# Patient Record
Sex: FEMALE | Race: WHITE
[De-identification: names, ages, dates, MRNs, and addresses within clinical notes are randomized per-mention and may not be internally consistent; named-entity substitution may affect disease eponyms.]

---

## 2017-09-17 ENCOUNTER — HOSPITAL ENCOUNTER (EMERGENCY)
Dept: HOSPITAL 47 - EC | Age: 82
LOS: 1 days | Discharge: HOME | End: 2017-09-18
Payer: MEDICARE

## 2017-09-17 VITALS — TEMPERATURE: 98.3 F | RESPIRATION RATE: 18 BRPM
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
## 2017-09-17 DIAGNOSIS — S60.512A: Primary | ICD-10-CM

## 2017-09-17 DIAGNOSIS — Z23: ICD-10-CM

## 2017-09-17 PROCEDURE — 90715 TDAP VACCINE 7 YRS/> IM: CPT

## 2017-09-17 PROCEDURE — 90471 IMMUNIZATION ADMIN: CPT

## 2017-09-17 PROCEDURE — 99282 EMERGENCY DEPT VISIT SF MDM: CPT

## 2017-09-17 NOTE — ED
Wound/Laceration HPI





- General


Chief Complaint: Wound/Laceration


Stated Complaint: hand lac


Time Seen by Provider: 09/17/17 23:19


Source: patient, RN notes reviewed


Mode of arrival: ambulatory


Limitations: altered mental status





- History of Present Illness


Initial Comments: 





83-year-old female presents emergency Department chief complaint of cat scratch 

her left hand.  She is unsure when her last tetanus was.  Patient states there'

s been some bruising from the site since the injury.  Patient states this was 

caused by her cat.  Patient states that she is diabetic though she is not 

taking any blood thinners and which would increase her bleeding recurs.  

Patient states she has full range of motion hand.





- Related Data


 Previous Rx's











 Medication  Instructions  Recorded


 


Amoxicillin/Potassium Clav 1 tab PO Q12HR #20 tab 09/17/17





[Augmentin 875-125 Tablet]  











 Allergies











Allergy/AdvReac Type Severity Reaction Status Date / Time


 


No Known Allergies Allergy   Verified 09/17/17 23:04














Review of Systems


ROS Statement: 


Those systems with pertinent positive or pertinent negative responses have been 

documented in the HPI.





ROS Other: All systems not noted in ROS Statement are negative.





Past Medical History


Past Medical History: Dementia, Diabetes Mellitus, Hypertension


History of Any Multi-Drug Resistant Organisms: None Reported


Past Surgical History: Cholecystectomy, Orthopedic Surgery


Past Psychological History: No Psychological Hx Reported


Smoking Status: Never smoker


Past Alcohol Use History: None Reported


Past Drug Use History: None Reported





General Exam


Limitations: altered mental status


General appearance: alert, in no apparent distress


Respiratory exam: Present: normal lung sounds bilaterally.  Absent: respiratory 

distress, wheezes, rales, rhonchi, stridor


Cardiovascular Exam: Present: regular rate, normal rhythm, normal heart sounds.

  Absent: systolic murmur, diastolic murmur, rubs, gallop, clicks


Extremities exam: Present: other (Left hand there is a 3 cm superficial 

laceration to the left hand between the first and second digit)


Skin exam: Present: warm, dry





Course





 Vital Signs











  09/17/17





  23:01


 


Temperature 98.3 F


 


Pulse Rate 65


 


Respiratory 18





Rate 


 


Blood Pressure 178/73


 


O2 Sat by Pulse 96





Oximetry 














Medical Decision Making





- Medical Decision Making





83-year-old female presented emergency for Ashtray this was thoroughly cleaned 

bacitracin applied there is no active bleeding.  The area was steri strips and 

dressed.  Patient we given antibiotics and return parameters were discussed.  

Patient's tetanus is updated.





Disposition


Clinical Impression: 


 Cat scratch





Disposition: HOME SELF-CARE


Condition: Stable


Instructions:  Animal Bite (ED)


Additional Instructions: 


Please return to the Emergency Department if symptoms worsen or any other 

concerns.


Prescriptions: 


Amoxicillin/Potassium Clav [Augmentin 875-125 Tablet] 1 tab PO Q12HR #20 tab


Referrals: 


Cipriano Burleson MD [Primary Care Provider] - 1-2 days


Time of Disposition: 23:36

## 2017-09-18 VITALS — DIASTOLIC BLOOD PRESSURE: 79 MMHG | HEART RATE: 72 BPM | SYSTOLIC BLOOD PRESSURE: 160 MMHG

## 2017-10-08 ENCOUNTER — HOSPITAL ENCOUNTER (EMERGENCY)
Dept: HOSPITAL 47 - EC | Age: 82
Discharge: HOME | End: 2017-10-08
Payer: MEDICARE

## 2017-10-08 VITALS — RESPIRATION RATE: 18 BRPM

## 2017-10-08 VITALS — HEART RATE: 78 BPM | TEMPERATURE: 97.6 F | SYSTOLIC BLOOD PRESSURE: 175 MMHG | DIASTOLIC BLOOD PRESSURE: 76 MMHG

## 2017-10-08 DIAGNOSIS — F03.90: ICD-10-CM

## 2017-10-08 DIAGNOSIS — N39.0: Primary | ICD-10-CM

## 2017-10-08 DIAGNOSIS — Z79.84: ICD-10-CM

## 2017-10-08 DIAGNOSIS — Z90.49: ICD-10-CM

## 2017-10-08 DIAGNOSIS — Z53.8: ICD-10-CM

## 2017-10-08 DIAGNOSIS — Z79.899: ICD-10-CM

## 2017-10-08 DIAGNOSIS — I10: ICD-10-CM

## 2017-10-08 DIAGNOSIS — E11.9: ICD-10-CM

## 2017-10-08 LAB
ALP SERPL-CCNC: 64 U/L (ref 38–126)
ALT SERPL-CCNC: 26 U/L (ref 9–52)
AMYLASE SERPL-CCNC: 41 U/L (ref 30–110)
ANION GAP SERPL CALC-SCNC: 11 MMOL/L
AST SERPL-CCNC: 36 U/L (ref 14–36)
BASOPHILS # BLD AUTO: 0 K/UL (ref 0–0.2)
BASOPHILS NFR BLD AUTO: 0 %
BUN SERPL-SCNC: 19 MG/DL (ref 7–17)
CALCIUM SPEC-MCNC: 9.6 MG/DL (ref 8.4–10.2)
CH: 29.3
CHCM: 32.1
CHLORIDE SERPL-SCNC: 105 MMOL/L (ref 98–107)
CO2 SERPL-SCNC: 25 MMOL/L (ref 22–30)
EOSINOPHIL # BLD AUTO: 0.3 K/UL (ref 0–0.7)
EOSINOPHIL NFR BLD AUTO: 3 %
ERYTHROCYTE [DISTWIDTH] IN BLOOD BY AUTOMATED COUNT: 4.43 M/UL (ref 3.8–5.4)
ERYTHROCYTE [DISTWIDTH] IN BLOOD: 14.4 % (ref 11.5–15.5)
GLUCOSE SERPL-MCNC: 145 MG/DL (ref 74–99)
GLUCOSE UR QL: (no result)
HCT VFR BLD AUTO: 40.7 % (ref 34–46)
HDW: 2.21
HGB BLD-MCNC: 13.4 GM/DL (ref 11.4–16)
LUC NFR BLD AUTO: 2 %
LYMPHOCYTES # SPEC AUTO: 1.9 K/UL (ref 1–4.8)
LYMPHOCYTES NFR SPEC AUTO: 23 %
MCH RBC QN AUTO: 30.1 PG (ref 25–35)
MCHC RBC AUTO-ENTMCNC: 32.8 G/DL (ref 31–37)
MCV RBC AUTO: 91.8 FL (ref 80–100)
MONOCYTES # BLD AUTO: 0.4 K/UL (ref 0–1)
MONOCYTES NFR BLD AUTO: 5 %
NEUTROPHILS # BLD AUTO: 5.6 K/UL (ref 1.3–7.7)
NEUTROPHILS NFR BLD AUTO: 67 %
NON-AFRICAN AMERICAN GFR(MDRD): >60
PARTICLE COUNT: 1587
PH UR: 7 [PH] (ref 5–8)
POTASSIUM SERPL-SCNC: 4.7 MMOL/L (ref 3.5–5.1)
PROT SERPL-MCNC: 7.3 G/DL (ref 6.3–8.2)
PROT UR QL: (no result)
RBC UR QL: 2 /HPF (ref 0–5)
SODIUM SERPL-SCNC: 141 MMOL/L (ref 137–145)
SP GR UR: 1.01 (ref 1–1.03)
SQUAMOUS UR QL AUTO: 3 /HPF (ref 0–4)
UA BILLING (MACRO VS. MICRO): (no result)
UROBILINOGEN UR QL STRIP: <2 MG/DL (ref ?–2)
WBC # BLD AUTO: 0.13 10*3/UL
WBC # BLD AUTO: 8.4 K/UL (ref 3.8–10.6)
WBC #/AREA URNS HPF: 18 /HPF (ref 0–5)
WBC (PEROX): 8.63

## 2017-10-08 PROCEDURE — 85025 COMPLETE CBC W/AUTO DIFF WBC: CPT

## 2017-10-08 PROCEDURE — 87086 URINE CULTURE/COLONY COUNT: CPT

## 2017-10-08 PROCEDURE — 82150 ASSAY OF AMYLASE: CPT

## 2017-10-08 PROCEDURE — 87077 CULTURE AEROBIC IDENTIFY: CPT

## 2017-10-08 PROCEDURE — 83690 ASSAY OF LIPASE: CPT

## 2017-10-08 PROCEDURE — 96360 HYDRATION IV INFUSION INIT: CPT

## 2017-10-08 PROCEDURE — 36415 COLL VENOUS BLD VENIPUNCTURE: CPT

## 2017-10-08 PROCEDURE — 74177 CT ABD & PELVIS W/CONTRAST: CPT

## 2017-10-08 PROCEDURE — 99284 EMERGENCY DEPT VISIT MOD MDM: CPT

## 2017-10-08 PROCEDURE — 87186 SC STD MICRODIL/AGAR DIL: CPT

## 2017-10-08 PROCEDURE — 81001 URINALYSIS AUTO W/SCOPE: CPT

## 2017-10-08 PROCEDURE — 96361 HYDRATE IV INFUSION ADD-ON: CPT

## 2017-10-08 PROCEDURE — 83605 ASSAY OF LACTIC ACID: CPT

## 2017-10-08 PROCEDURE — 80053 COMPREHEN METABOLIC PANEL: CPT

## 2017-10-08 NOTE — CT
EXAMINATION TYPE: CT abdomen pelvis w con

 

DATE OF EXAM: 10/8/2017

 

COMPARISON: NONE

 

HISTORY: Left sided pain

 

CT DLP: 602.8 mGycm

Automated exposure control for dose reduction was used.

 

TECHNIQUE:  Helical acquisition of images was performed from the lung bases through the pelvis.

 

CONTRAST: 

Performed without Oral Contrast and with IV Contrast, patient injected with 100 mL of Omnipaque 300.

 

FINDINGS: 

 

Heart appears enlarged. Lung bases are clear of consolidation. There is no pleural effusion.

 

Liver shows no focal defect. Spleen and pancreas appear normal. Gallbladder is not seen. Bile ducts a
re not dilated.

 

There is no adrenal mass. There are small bilateral renal cortical cysts. There is no hydronephrosis.
 There is no retroperitoneal adenopathy. There is no ascites. There are multiple sigmoid diverticula.
 Bladder distends smoothly. There is no sign of a pelvic mass. There are spondylotic changes in the l
umbar spine. Abdominal aorta is atheromatous.

IMPRESSION: 

MODERATELY SEVERE SIGMOID DIVERTICULOSIS. NO DEFINITE DIVERTICULITIS. ATHEROSCLEROTIC VASCULAR DISEAS
E. SPONDYLOTIC CHANGES IN THE LUMBAR SPINE. NO SIGN OF APPENDICITIS. MODERATE OSTEOARTHRITIS NOTED IN
 BOTH HIP JOINTS.

## 2017-10-08 NOTE — ED
General Adult HPI





- General


Chief complaint: Abdominal Pain


Stated complaint: Female 


Time Seen by Provider: 10/08/17 13:37


Source: patient, family, RN notes reviewed, old records reviewed


Mode of arrival: wheelchair


Limitations: no limitations





- History of Present Illness


Initial comments: 





This is an 84-year-old female to the ER for evaluation today.  Patient's 

presented for evaluation of abdominal pain.  States he has never had similar 

abdominal pain before, she is mainly concerned for urinary tract infection 

which is mid to left-sided abdominal pain left anterior abdominal pain.  Denies 

blood in her urine denies diarrhea denies nausea or vomiting.  No fevers no 

travel history.  Patient does have history of cholecystectomy.  Patient states 

she is eating and having bowel movement at this time.  Again pain started 

yesterday into today and is progressively worsening





- Related Data


 Home Medications











 Medication  Instructions  Recorded  Confirmed


 


Donepezil [Aricept] 10 mg PO DAILY 10/08/17 10/08/17


 


Losartan Potassium 75 mg PO DAILY 10/08/17 10/08/17


 


amLODIPine [Norvasc] 10 mg PO DAILY 10/08/17 10/08/17


 


metFORMIN HCL [Glucophage] 500 mg PO BID 10/08/17 10/08/17








 Previous Rx's











 Medication  Instructions  Recorded


 


Nitrofurantoin Monohyd/M-Cryst 100 mg PO Q12HR #10 cap 10/08/17





[Macrobid]  











 Allergies











Allergy/AdvReac Type Severity Reaction Status Date / Time


 


No Known Allergies Allergy   Verified 10/08/17 14:26














Review of Systems


ROS Statement: 


Those systems with pertinent positive or pertinent negative responses have been 

documented in the HPI.





ROS Other: All systems not noted in ROS Statement are negative.





Past Medical History


Past Medical History: Dementia, Diabetes Mellitus, Hypertension


History of Any Multi-Drug Resistant Organisms: None Reported


Past Surgical History: Cholecystectomy, Orthopedic Surgery


Past Psychological History: No Psychological Hx Reported


Smoking Status: Never smoker


Past Alcohol Use History: None Reported


Past Drug Use History: None Reported





General Exam


Limitations: no limitations


General appearance: alert, in no apparent distress


Head exam: Present: atraumatic, normocephalic, normal inspection


Eye exam: Present: normal appearance, PERRL, EOMI.  Absent: scleral icterus, 

conjunctival injection, periorbital swelling


ENT exam: Present: normal exam, mucous membranes moist


Neck exam: Present: normal inspection.  Absent: tenderness, meningismus, 

lymphadenopathy


Respiratory exam: Present: normal lung sounds bilaterally.  Absent: respiratory 

distress, wheezes, rales, rhonchi, stridor


Cardiovascular Exam: Present: regular rate, normal rhythm, normal heart sounds.

  Absent: systolic murmur, diastolic murmur, rubs, gallop, clicks


GI/Abdominal exam: Present: soft, tenderness (Left lower quadrant pain), normal 

bowel sounds.  Absent: distended, guarding, rebound, rigid


Extremities exam: Present: normal inspection, full ROM, normal capillary 

refill.  Absent: tenderness, pedal edema, joint swelling, calf tenderness


Back exam: Present: normal inspection


Neurological exam: Present: alert, oriented X3, CN II-XII intact


Psychiatric exam: Present: normal affect, normal mood


Skin exam: Present: warm, dry, intact, normal color.  Absent: rash





Course


 Vital Signs











  10/08/17 10/08/17





  13:15 14:29


 


Temperature 98.7 F 


 


Pulse Rate 79 73


 


Respiratory 18 18





Rate  


 


Blood Pressure 169/77 165/81


 


O2 Sat by Pulse 95 95





Oximetry  














- Reevaluation(s)


Reevaluation #1: 





10/08/17 15:52


Patient has adequate pain control at this time with no distress





Medical Decision Making





- Medical Decision Making





84 female to the ER for evaluation of bowel pain.  Mild dysuria.  Patient has 

positive urinary tract infection lab work and CT are negative.  Patient can be 

discharged on





- Lab Data


Result diagrams: 


 10/08/17 14:26





 10/08/17 14:26


 Lab Results











  10/08/17 10/08/17 10/08/17 Range/Units





  14:17 14:26 14:26 


 


WBC    8.4  (3.8-10.6)  k/uL


 


RBC    4.43  (3.80-5.40)  m/uL


 


Hgb    13.4  (11.4-16.0)  gm/dL


 


Hct    40.7  (34.0-46.0)  %


 


MCV    91.8  (80.0-100.0)  fL


 


MCH    30.1  (25.0-35.0)  pg


 


MCHC    32.8  (31.0-37.0)  g/dL


 


RDW    14.4  (11.5-15.5)  %


 


Plt Count    360  (150-450)  k/uL


 


Neutrophils %    67  %


 


Lymphocytes %    23  %


 


Monocytes %    5  %


 


Eosinophils %    3  %


 


Basophils %    0  %


 


Neutrophils #    5.6  (1.3-7.7)  k/uL


 


Lymphocytes #    1.9  (1.0-4.8)  k/uL


 


Monocytes #    0.4  (0-1.0)  k/uL


 


Eosinophils #    0.3  (0-0.7)  k/uL


 


Basophils #    0.0  (0-0.2)  k/uL


 


Sodium   141   (137-145)  mmol/L


 


Potassium   4.7   (3.5-5.1)  mmol/L


 


Chloride   105   ()  mmol/L


 


Carbon Dioxide   25   (22-30)  mmol/L


 


Anion Gap   11   mmol/L


 


BUN   19 H   (7-17)  mg/dL


 


Creatinine   0.82   (0.52-1.04)  mg/dL


 


Est GFR (MDRD) Af Amer   >60   (>60 ml/min/1.73 sqM)  


 


Est GFR (MDRD) Non-Af   >60   (>60 ml/min/1.73 sqM)  


 


Glucose   145 H   (74-99)  mg/dL


 


Plasma Lactic Acid Victor Manuel     (0.7-2.0)  mmol/L


 


Calcium   9.6   (8.4-10.2)  mg/dL


 


Total Bilirubin   0.7   (0.2-1.3)  mg/dL


 


AST   36   (14-36)  U/L


 


ALT   26   (9-52)  U/L


 


Alkaline Phosphatase   64   ()  U/L


 


Total Protein   7.3   (6.3-8.2)  g/dL


 


Albumin   4.2   (3.5-5.0)  g/dL


 


Amylase   41   ()  U/L


 


Lipase   95   ()  U/L


 


Urine Color  Yellow    


 


Urine Appearance  Clear    (Clear)  


 


Urine pH  7.0    (5.0-8.0)  


 


Ur Specific Gravity  1.014    (1.001-1.035)  


 


Urine Protein  2+ H    (Negative)  


 


Urine Glucose (UA)  1+ H    (Negative)  


 


Urine Ketones  Negative    (Negative)  


 


Urine Blood  Negative    (Negative)  


 


Urine Nitrite  Negative    (Negative)  


 


Urine Bilirubin  Negative    (Negative)  


 


Urine Urobilinogen  <2.0    (<2.0)  mg/dL


 


Ur Leukocyte Esterase  Moderate H    (Negative)  


 


Urine RBC  2    (0-5)  /hpf


 


Urine WBC  18 H    (0-5)  /hpf


 


Ur Squamous Epith Cells  3    (0-4)  /hpf


 


Urine Mucus  Rare H    (None)  /hpf














  10/08/17 Range/Units





  14:26 


 


WBC   (3.8-10.6)  k/uL


 


RBC   (3.80-5.40)  m/uL


 


Hgb   (11.4-16.0)  gm/dL


 


Hct   (34.0-46.0)  %


 


MCV   (80.0-100.0)  fL


 


MCH   (25.0-35.0)  pg


 


MCHC   (31.0-37.0)  g/dL


 


RDW   (11.5-15.5)  %


 


Plt Count   (150-450)  k/uL


 


Neutrophils %   %


 


Lymphocytes %   %


 


Monocytes %   %


 


Eosinophils %   %


 


Basophils %   %


 


Neutrophils #   (1.3-7.7)  k/uL


 


Lymphocytes #   (1.0-4.8)  k/uL


 


Monocytes #   (0-1.0)  k/uL


 


Eosinophils #   (0-0.7)  k/uL


 


Basophils #   (0-0.2)  k/uL


 


Sodium   (137-145)  mmol/L


 


Potassium   (3.5-5.1)  mmol/L


 


Chloride   ()  mmol/L


 


Carbon Dioxide   (22-30)  mmol/L


 


Anion Gap   mmol/L


 


BUN   (7-17)  mg/dL


 


Creatinine   (0.52-1.04)  mg/dL


 


Est GFR (MDRD) Af Amer   (>60 ml/min/1.73 sqM)  


 


Est GFR (MDRD) Non-Af   (>60 ml/min/1.73 sqM)  


 


Glucose   (74-99)  mg/dL


 


Plasma Lactic Acid Victor Manuel  1.5  (0.7-2.0)  mmol/L


 


Calcium   (8.4-10.2)  mg/dL


 


Total Bilirubin   (0.2-1.3)  mg/dL


 


AST   (14-36)  U/L


 


ALT   (9-52)  U/L


 


Alkaline Phosphatase   ()  U/L


 


Total Protein   (6.3-8.2)  g/dL


 


Albumin   (3.5-5.0)  g/dL


 


Amylase   ()  U/L


 


Lipase   ()  U/L


 


Urine Color   


 


Urine Appearance   (Clear)  


 


Urine pH   (5.0-8.0)  


 


Ur Specific Gravity   (1.001-1.035)  


 


Urine Protein   (Negative)  


 


Urine Glucose (UA)   (Negative)  


 


Urine Ketones   (Negative)  


 


Urine Blood   (Negative)  


 


Urine Nitrite   (Negative)  


 


Urine Bilirubin   (Negative)  


 


Urine Urobilinogen   (<2.0)  mg/dL


 


Ur Leukocyte Esterase   (Negative)  


 


Urine RBC   (0-5)  /hpf


 


Urine WBC   (0-5)  /hpf


 


Ur Squamous Epith Cells   (0-4)  /hpf


 


Urine Mucus   (None)  /hpf














- Radiology Data


Radiology results: report reviewed (CT abdomen and pelvis is negative), image 

reviewed





Disposition


Clinical Impression: 


 UTI (urinary tract infection), Abdominal pain





Disposition: HOME SELF-CARE


Condition: Good


Instructions:  Urinary Tract Infection in Women (ED)


Prescriptions: 


Nitrofurantoin Monohyd/M-Cryst [Macrobid] 100 mg PO Q12HR #10 cap


Referrals: 


Cipriano Burleson MD [Primary Care Provider] - 1-2 days

## 2018-03-21 ENCOUNTER — HOSPITAL ENCOUNTER (INPATIENT)
Dept: HOSPITAL 47 - EC | Age: 83
LOS: 5 days | Discharge: NURSING, REHAB OR LONG-TERM CARE | DRG: 195 | End: 2018-03-26
Attending: FAMILY MEDICINE | Admitting: FAMILY MEDICINE
Payer: MEDICARE

## 2018-03-21 DIAGNOSIS — N28.9: ICD-10-CM

## 2018-03-21 DIAGNOSIS — F03.90: ICD-10-CM

## 2018-03-21 DIAGNOSIS — H91.10: ICD-10-CM

## 2018-03-21 DIAGNOSIS — Z86.19: ICD-10-CM

## 2018-03-21 DIAGNOSIS — Z90.49: ICD-10-CM

## 2018-03-21 DIAGNOSIS — E11.9: ICD-10-CM

## 2018-03-21 DIAGNOSIS — R19.7: ICD-10-CM

## 2018-03-21 DIAGNOSIS — Z87.440: ICD-10-CM

## 2018-03-21 DIAGNOSIS — I10: ICD-10-CM

## 2018-03-21 DIAGNOSIS — Z79.899: ICD-10-CM

## 2018-03-21 DIAGNOSIS — Z79.84: ICD-10-CM

## 2018-03-21 DIAGNOSIS — J18.9: Primary | ICD-10-CM

## 2018-03-21 LAB
ALBUMIN SERPL-MCNC: 3.1 G/DL (ref 3.5–5)
ALP SERPL-CCNC: 55 U/L (ref 38–126)
ALT SERPL-CCNC: 21 U/L (ref 9–52)
ANION GAP SERPL CALC-SCNC: 10 MMOL/L
APTT BLD: 22.1 SEC (ref 22–30)
AST SERPL-CCNC: 20 U/L (ref 14–36)
BASOPHILS # BLD AUTO: 0 K/UL (ref 0–0.2)
BASOPHILS NFR BLD AUTO: 0 %
BUN SERPL-SCNC: 34 MG/DL (ref 7–17)
CALCIUM SPEC-MCNC: 8.7 MG/DL (ref 8.4–10.2)
CHLORIDE SERPL-SCNC: 103 MMOL/L (ref 98–107)
CK SERPL-CCNC: 57 U/L (ref 30–135)
CK SERPL-CCNC: 62 U/L (ref 30–135)
CK SERPL-CCNC: 77 U/L (ref 30–135)
CO2 SERPL-SCNC: 23 MMOL/L (ref 22–30)
EOSINOPHIL # BLD AUTO: 0.1 K/UL (ref 0–0.7)
EOSINOPHIL NFR BLD AUTO: 1 %
ERYTHROCYTE [DISTWIDTH] IN BLOOD BY AUTOMATED COUNT: 3.99 M/UL (ref 3.8–5.4)
ERYTHROCYTE [DISTWIDTH] IN BLOOD: 13.8 % (ref 11.5–15.5)
GLUCOSE BLD-MCNC: 217 MG/DL (ref 75–99)
GLUCOSE SERPL-MCNC: 214 MG/DL (ref 74–99)
HCT VFR BLD AUTO: 34.6 % (ref 34–46)
HGB BLD-MCNC: 11.8 GM/DL (ref 11.4–16)
INR PPP: 1 (ref ?–1.2)
LYMPHOCYTES # SPEC AUTO: 0.7 K/UL (ref 1–4.8)
LYMPHOCYTES NFR SPEC AUTO: 9 %
MCH RBC QN AUTO: 29.5 PG (ref 25–35)
MCHC RBC AUTO-ENTMCNC: 34 G/DL (ref 31–37)
MCV RBC AUTO: 86.7 FL (ref 80–100)
MONOCYTES # BLD AUTO: 0.5 K/UL (ref 0–1)
MONOCYTES NFR BLD AUTO: 7 %
NEUTROPHILS # BLD AUTO: 6.4 K/UL (ref 1.3–7.7)
NEUTROPHILS NFR BLD AUTO: 82 %
PLATELET # BLD AUTO: 305 K/UL (ref 150–450)
POTASSIUM SERPL-SCNC: 5.2 MMOL/L (ref 3.5–5.1)
PROT SERPL-MCNC: 5.7 G/DL (ref 6.3–8.2)
PT BLD: 9.6 SEC (ref 9–12)
SODIUM SERPL-SCNC: 136 MMOL/L (ref 137–145)
TROPONIN I SERPL-MCNC: <0.012 NG/ML (ref 0–0.03)
WBC # BLD AUTO: 7.8 K/UL (ref 3.8–10.6)

## 2018-03-21 PROCEDURE — 36415 COLL VENOUS BLD VENIPUNCTURE: CPT

## 2018-03-21 PROCEDURE — 83880 ASSAY OF NATRIURETIC PEPTIDE: CPT

## 2018-03-21 PROCEDURE — 82553 CREATINE MB FRACTION: CPT

## 2018-03-21 PROCEDURE — 94760 N-INVAS EAR/PLS OXIMETRY 1: CPT

## 2018-03-21 PROCEDURE — 85025 COMPLETE CBC W/AUTO DIFF WBC: CPT

## 2018-03-21 PROCEDURE — 85730 THROMBOPLASTIN TIME PARTIAL: CPT

## 2018-03-21 PROCEDURE — 93005 ELECTROCARDIOGRAM TRACING: CPT

## 2018-03-21 PROCEDURE — 82550 ASSAY OF CK (CPK): CPT

## 2018-03-21 PROCEDURE — 71046 X-RAY EXAM CHEST 2 VIEWS: CPT

## 2018-03-21 PROCEDURE — 85027 COMPLETE CBC AUTOMATED: CPT

## 2018-03-21 PROCEDURE — 80053 COMPREHEN METABOLIC PANEL: CPT

## 2018-03-21 PROCEDURE — 84484 ASSAY OF TROPONIN QUANT: CPT

## 2018-03-21 PROCEDURE — 85610 PROTHROMBIN TIME: CPT

## 2018-03-21 PROCEDURE — 99285 EMERGENCY DEPT VISIT HI MDM: CPT

## 2018-03-21 PROCEDURE — 96374 THER/PROPH/DIAG INJ IV PUSH: CPT

## 2018-03-21 RX ADMIN — ATENOLOL SCH MG: 50 TABLET ORAL at 20:08

## 2018-03-21 RX ADMIN — MEMANTINE HYDROCHLORIDE SCH MG: 5 TABLET ORAL at 20:08

## 2018-03-21 RX ADMIN — DONEPEZIL HYDROCHLORIDE SCH MG: 10 TABLET ORAL at 20:08

## 2018-03-21 RX ADMIN — Medication SCH MG: at 20:08

## 2018-03-21 NOTE — XR
EXAMINATION TYPE: XR chest 2V

 

DATE OF EXAM: 3/21/2018

 

COMPARISON: NONE

 

HISTORY: Cough and difficulty breathing.

 

TECHNIQUE:  Frontal and lateral views of the chest are obtained.

 

FINDINGS:  There is suspicious retrocardiac opacity on 2 views with air bronchograms. Right lung is c
lear. The cardiac silhouette size is upper limits of normal with atherosclerotic change in aortic kno
b. Spine is straightened on lateral image.

 

IMPRESSION:  Suspicious posterior left lower lobe infiltrate.

## 2018-03-21 NOTE — ED
SOB HPI





- General


Chief Complaint: Shortness of Breath


Stated Complaint: Cough, Congestion


Time Seen by Provider: 03/21/18 10:49


Source: patient, family


Mode of arrival: wheelchair


Limitations: altered mental status





- History of Present Illness


Initial Comments: 


84 years old lady came to the doctor's office she was quite short winded there 

she says sure shortness of breath been around for 2 days now and denies any 

chest pain it doesn't hurt to take deep breaths denies any fever or any chills 

she has been spitting up some phlegm.  She does have a history of diabetes and 

hypertension.  Denies any fever no chills no neck stiffness no abdominal pain 

no frequency urgency dysuria no symptoms of TIA or CVA








- Related Data


 Home Medications











 Medication  Instructions  Recorded  Confirmed


 


Donepezil [Aricept] 10 mg PO HS 10/08/17 03/21/18


 


Losartan Potassium 75 mg PO DAILY 10/08/17 03/21/18


 


amLODIPine [Norvasc] 10 mg PO DAILY 10/08/17 03/21/18


 


metFORMIN HCL [Glucophage] 500 mg PO AC-BID 10/08/17 03/21/18


 


Atenolol [Tenormin] 50 mg PO BID 03/21/18 03/21/18


 


Magnesium Oxide [Mag-Ox] 400 mg PO BID 03/21/18 03/21/18


 


Memantine [Namenda] 5 mg PO BID 03/21/18 03/21/18


 


Pantoprazole [Protonix] 40 mg PO DAILY 03/21/18 03/21/18


 


glipiZIDE [Glucotrol] 10 mg PO AC-BID 03/21/18 03/21/18











 Allergies











Allergy/AdvReac Type Severity Reaction Status Date / Time


 


No Known Allergies Allergy   Verified 03/21/18 10:38














Review of Systems


ROS Statement: 


Those systems with pertinent positive or pertinent negative responses have been 

documented in the HPI.





ROS Other: All systems not noted in ROS Statement are negative.





Past Medical History


Past Medical History: Dementia, Diabetes Mellitus, Hypertension


History of Any Multi-Drug Resistant Organisms: ESBL


Date of last positivie culture/infection: 10/8/17


MDRO Source:: URINE ESBL


Past Surgical History: Cholecystectomy, Orthopedic Surgery


Past Psychological History: No Psychological Hx Reported


Smoking Status: Never smoker


Past Alcohol Use History: None Reported


Past Drug Use History: None Reported





General Exam





- General Exam Comments


Initial Comments: 


General:  The patient is awake and alert, in mild distress 


Skin:  Skin is warm and dry and no rashes or lesions are noted. 


Eye:  Pupils are equal, round and reactive to light, extra-ocular movements are 

intact; there is normal conjunctiva bilaterally.  


Ears, nose, mouth and throat:  There are moist mucous membranes and no oral 

lesions. 


Neck:  The neck is supple, there is no tenderness    


Cardiovascular:  There is a regular rate and rhythm. No murmur, rub or gallop 

is appreciated.


Respiratory: To auscultation bilateral, noticed some crackles at the bases


Gastrointestinal:  Soft, non-distended, non-tender abdomen without masses or 

organomegaly noted. There is no rebound or guarding present. Bowel sounds are 

unremarkable. 


Back:  There is no tenderness to palpation in the midline. There is no obvious 

deformity.


Musculoskeletal:  Normal ROM, no tenderness, There is no pedal edema. There is 

no calf tenderness or swelling. No cords were appreciated.  


Neurological:  CN II-XII intact, Cranial nerves III through XII are intact. 

There are no obvious motor or sensory deficits. Coordination appears grossly 

intact. Speech is normal.


Psychiatric:  Cooperative, appropriate mood & affect, normal judgment.  








Limitations: altered mental status





Course


 Vital Signs











  03/21/18 03/21/18 03/21/18





  10:36 11:03 11:45


 


Temperature 98.0 F  


 


Pulse Rate 65 68 62


 


Respiratory 20 20 20





Rate   


 


Blood Pressure 148/68 150/65 127/60


 


O2 Sat by Pulse 94 L 97 97





Oximetry   














  03/21/18 03/21/18





  13:33 14:42


 


Temperature  


 


Pulse Rate 57 L 59 L


 


Respiratory 20 18





Rate  


 


Blood Pressure 121/60 116/59


 


O2 Sat by Pulse 97 97





Oximetry  








EKG is sinus bradycardia ventricular rate is 57 CT interval is 144 QRS duration 

is 72 QT/QTc is 470/444 review of this EKG does not reveal any ST elevation or 

ST depression





She is reassessed, CBC, INR, BS metabolic panel are unremarkable glucose is 214 

creatinine is 1.27 troponin is negative EKG was unremarkable considering she is 

84 and has pneumonia





Medical Decision Making





- Lab Data


Result diagrams: 


 03/21/18 11:00





 03/21/18 12:22


 Lab Results











  03/21/18 03/21/18 03/21/18 Range/Units





  11:00 11:00 11:00 


 


WBC   7.8   (3.8-10.6)  k/uL


 


RBC   3.99   (3.80-5.40)  m/uL


 


Hgb   11.8   (11.4-16.0)  gm/dL


 


Hct   34.6   (34.0-46.0)  %


 


MCV   86.7   (80.0-100.0)  fL


 


MCH   29.5   (25.0-35.0)  pg


 


MCHC   34.0   (31.0-37.0)  g/dL


 


RDW   13.8   (11.5-15.5)  %


 


Plt Count   305   (150-450)  k/uL


 


Neutrophils %   82   %


 


Lymphocytes %   9   %


 


Monocytes %   7   %


 


Eosinophils %   1   %


 


Basophils %   0   %


 


Neutrophils #   6.4   (1.3-7.7)  k/uL


 


Lymphocytes #   0.7 L   (1.0-4.8)  k/uL


 


Monocytes #   0.5   (0-1.0)  k/uL


 


Eosinophils #   0.1   (0-0.7)  k/uL


 


Basophils #   0.0   (0-0.2)  k/uL


 


PT     (9.0-12.0)  sec


 


INR     (<1.2)  


 


APTT     (22.0-30.0)  sec


 


Sodium     (137-145)  mmol/L


 


Potassium     (3.5-5.1)  mmol/L


 


Chloride     ()  mmol/L


 


Carbon Dioxide     (22-30)  mmol/L


 


Anion Gap     mmol/L


 


BUN     (7-17)  mg/dL


 


Creatinine     (0.52-1.04)  mg/dL


 


Est GFR (CKD-EPI)AfAm     (>60 ml/min/1.73 sqM)  


 


Est GFR (CKD-EPI)NonAf     (>60 ml/min/1.73 sqM)  


 


Glucose     (74-99)  mg/dL


 


Calcium     (8.4-10.2)  mg/dL


 


Total Bilirubin     (0.2-1.3)  mg/dL


 


AST     (14-36)  U/L


 


ALT     (9-52)  U/L


 


Alkaline Phosphatase     ()  U/L


 


Total Creatine Kinase  77    ()  U/L


 


CK-MB (CK-2)  1.0    (0.0-2.4)  ng/mL


 


CK-MB (CK-2) Rel Index  1.3    


 


Troponin I  <0.012    (0.000-0.034)  ng/mL


 


NT-Pro-B Natriuret Pep    1400  pg/mL


 


Total Protein     (6.3-8.2)  g/dL


 


Albumin     (3.5-5.0)  g/dL














  03/21/18 03/21/18 Range/Units





  11:00 12:22 


 


WBC    (3.8-10.6)  k/uL


 


RBC    (3.80-5.40)  m/uL


 


Hgb    (11.4-16.0)  gm/dL


 


Hct    (34.0-46.0)  %


 


MCV    (80.0-100.0)  fL


 


MCH    (25.0-35.0)  pg


 


MCHC    (31.0-37.0)  g/dL


 


RDW    (11.5-15.5)  %


 


Plt Count    (150-450)  k/uL


 


Neutrophils %    %


 


Lymphocytes %    %


 


Monocytes %    %


 


Eosinophils %    %


 


Basophils %    %


 


Neutrophils #    (1.3-7.7)  k/uL


 


Lymphocytes #    (1.0-4.8)  k/uL


 


Monocytes #    (0-1.0)  k/uL


 


Eosinophils #    (0-0.7)  k/uL


 


Basophils #    (0-0.2)  k/uL


 


PT  9.6   (9.0-12.0)  sec


 


INR  1.0   (<1.2)  


 


APTT  22.1   (22.0-30.0)  sec


 


Sodium   136 L  (137-145)  mmol/L


 


Potassium   5.2 H  (3.5-5.1)  mmol/L


 


Chloride   103  ()  mmol/L


 


Carbon Dioxide   23  (22-30)  mmol/L


 


Anion Gap   10  mmol/L


 


BUN   34 H  (7-17)  mg/dL


 


Creatinine   1.27 H  (0.52-1.04)  mg/dL


 


Est GFR (CKD-EPI)AfAm   45  (>60 ml/min/1.73 sqM)  


 


Est GFR (CKD-EPI)NonAf   39  (>60 ml/min/1.73 sqM)  


 


Glucose   214 H  (74-99)  mg/dL


 


Calcium   8.7  (8.4-10.2)  mg/dL


 


Total Bilirubin   0.4  (0.2-1.3)  mg/dL


 


AST   20  (14-36)  U/L


 


ALT   21  (9-52)  U/L


 


Alkaline Phosphatase   55  ()  U/L


 


Total Creatine Kinase    ()  U/L


 


CK-MB (CK-2)    (0.0-2.4)  ng/mL


 


CK-MB (CK-2) Rel Index    


 


Troponin I    (0.000-0.034)  ng/mL


 


NT-Pro-B Natriuret Pep    pg/mL


 


Total Protein   5.7 L  (6.3-8.2)  g/dL


 


Albumin   3.1 L  (3.5-5.0)  g/dL














Disposition


Clinical Impression: 


 Acute pulmonary edema, Pneumonia, Dyspnea, Renal insufficiency





Disposition: ADMITTED AS IP TO THIS HOSP


Condition: Good


Referrals: 


Cipriano Burleson MD [Primary Care Provider] - 1-2 days

## 2018-03-22 LAB
GLUCOSE BLD-MCNC: 175 MG/DL (ref 75–99)
GLUCOSE BLD-MCNC: 188 MG/DL (ref 75–99)
TROPONIN I SERPL-MCNC: <0.012 NG/ML (ref 0–0.03)

## 2018-03-22 RX ADMIN — DONEPEZIL HYDROCHLORIDE SCH MG: 10 TABLET ORAL at 21:02

## 2018-03-22 RX ADMIN — MEMANTINE HYDROCHLORIDE SCH MG: 5 TABLET ORAL at 09:47

## 2018-03-22 RX ADMIN — CEFTRIAXONE SODIUM SCH MG: 2 INJECTION, POWDER, FOR SOLUTION INTRAMUSCULAR; INTRAVENOUS at 10:41

## 2018-03-22 RX ADMIN — Medication SCH MG: at 09:47

## 2018-03-22 RX ADMIN — Medication SCH MG: at 21:02

## 2018-03-22 RX ADMIN — ATENOLOL SCH MG: 50 TABLET ORAL at 09:47

## 2018-03-22 RX ADMIN — MEMANTINE HYDROCHLORIDE SCH MG: 5 TABLET ORAL at 21:02

## 2018-03-22 RX ADMIN — ATENOLOL SCH MG: 50 TABLET ORAL at 21:02

## 2018-03-22 RX ADMIN — LOSARTAN POTASSIUM SCH MG: 25 TABLET, FILM COATED ORAL at 10:40

## 2018-03-22 RX ADMIN — PANTOPRAZOLE SODIUM SCH MG: 40 TABLET, DELAYED RELEASE ORAL at 10:40

## 2018-03-22 NOTE — P.HPIM
History of Present Illness


H&P Date: 03/22/18


Chief Complaint: Cough and shortness of breath.





This is a history and physical on a 4-year-old white female who recently saw my 

nurse practitioner yesterday and had significant cough and congestion.  

Evaluation in the emergency room because of her advancing age did show left 

lower lobe infiltrate.  She is now admitted for significant pneumonia given her 

high risk score.  She hasn't underlying history diabetes.  She is nonsmoker she 

has no second smoke exposure.  The patient is lucid and resting comfortably at 

this point.  No fever this morning.  However she does feel warm to the touch.  

The patient does not complain of any significant diarrhea or constipation.  The 

patient suspect she's had this for about 3-4 days.  Over-the-counter medication 

has not been palliative.





Review of Systems


Constitutional: Reports fever


Eyes: denies blurred vision, denies pain


Ears, nose, mouth and throat: Denies headache, Denies sore throat


Cardiovascular: Reports decreased exercise tolerance


Respiratory: Reports cough, Reports cough with sputum


Gastrointestinal: Denies abdominal pain, Denies diarrhea, Denies nausea, Denies 

vomiting


Genitourinary: Denies dysuria, Denies hematuria


Musculoskeletal: Denies myalgias





Past Medical History


Past Medical History: Dementia, Diabetes Mellitus, Hypertension


Additional Past Medical History / Comment(s): uti-ecoli


History of Any Multi-Drug Resistant Organisms: ESBL


Date of last positivie culture/infection: 10/8/17


MDRO Source:: URINE ESBL


Past Surgical History: Cholecystectomy


Past Anesthesia/Blood Transfusion Reactions: No Reported Reaction


Smoking Status: Never smoker





- Past Family History


  ** Father


History Unknown: Yes





  ** Mother


History Unknown: Yes





Medications and Allergies


 Home Medications











 Medication  Instructions  Recorded  Confirmed  Type


 


Donepezil [Aricept] 10 mg PO HS 10/08/17 03/21/18 History


 


Losartan Potassium 75 mg PO DAILY 10/08/17 03/21/18 History


 


amLODIPine [Norvasc] 10 mg PO DAILY 10/08/17 03/21/18 History


 


metFORMIN HCL [Glucophage] 500 mg PO AC-BID 10/08/17 03/21/18 History


 


Atenolol [Tenormin] 50 mg PO BID 03/21/18 03/21/18 History


 


Magnesium Oxide [Mag-Ox] 400 mg PO BID 03/21/18 03/21/18 History


 


Memantine [Namenda] 5 mg PO BID 03/21/18 03/21/18 History


 


Pantoprazole [Protonix] 40 mg PO DAILY 03/21/18 03/21/18 History


 


glipiZIDE [Glucotrol] 10 mg PO AC-BID 03/21/18 03/21/18 History











 Allergies











Allergy/AdvReac Type Severity Reaction Status Date / Time


 


No Known Allergies Allergy   Verified 03/21/18 10:38














Physical Exam


Vitals: 


 Vital Signs











  Temp Pulse Pulse Resp BP BP Pulse Ox


 


 03/21/18 23:00  100.3 F H   64  20   134/53  93 L


 


 03/21/18 18:03  100.7 F H   66  18   149/78  96


 


 03/21/18 17:19  99.1 F  64   20  149/61   95


 


 03/21/18 15:47   58 L   20  143/67   96


 


 03/21/18 14:42   59 L   18  116/59   97


 


 03/21/18 13:33   57 L   20  121/60   97


 


 03/21/18 11:45   62   20  127/60   97


 


 03/21/18 11:03   68   20  150/65   97


 


 03/21/18 10:36  98.0 F  65   20  148/68   94 L








 Intake and Output











 03/21/18 03/22/18 03/22/18





 22:59 06:59 14:59


 


Other:   


 


  Voiding Method Toilet  


 


  # Voids 1 1 


 


  Weight 65 kg  














- Constitutional


General appearance: no acute distress





- EENT


Eyes: EOMI





- Neck


Neck: no lymphadenopathy





- Respiratory


Respiratory: left: rhonchi, wheezing





- Cardiovascular


Rhythm: regular


Heart sounds: normal: S1, S2


Abnormal Heart Sounds: no S3 Gallop





- Gastrointestinal


General gastrointestinal: soft, no tenderness





- Integumentary


Integumentary: no cellulitis





- Neurologic


Neurologic: CNII-XII intact





- Musculoskeletal


Musculoskeletal: generalized weakness





- Psychiatric


Psychiatric: A&O x's 3, appropriate affect





Results


CBC & Chem 7: 


 03/21/18 11:00





 03/21/18 12:22


Labs: 


 Abnormal Lab Results - Last 24 Hours (Table)











  03/21/18 03/21/18 03/21/18 Range/Units





  11:00 12:22 20:49 


 


Lymphocytes #  0.7 L    (1.0-4.8)  k/uL


 


Sodium   136 L   (137-145)  mmol/L


 


Potassium   5.2 H   (3.5-5.1)  mmol/L


 


BUN   34 H   (7-17)  mg/dL


 


Creatinine   1.27 H   (0.52-1.04)  mg/dL


 


Glucose   214 H   (74-99)  mg/dL


 


POC Glucose (mg/dL)    217 H  (75-99)  mg/dL


 


Total Protein   5.7 L   (6.3-8.2)  g/dL


 


Albumin   3.1 L   (3.5-5.0)  g/dL














  03/22/18 Range/Units





  06:58 


 


Lymphocytes #   (1.0-4.8)  k/uL


 


Sodium   (137-145)  mmol/L


 


Potassium   (3.5-5.1)  mmol/L


 


BUN   (7-17)  mg/dL


 


Creatinine   (0.52-1.04)  mg/dL


 


Glucose   (74-99)  mg/dL


 


POC Glucose (mg/dL)  188 H  (75-99)  mg/dL


 


Total Protein   (6.3-8.2)  g/dL


 


Albumin   (3.5-5.0)  g/dL














Assessment and Plan


(1) Diabetes


Current Visit: Yes   Status: Chronic   Priority: Medium   Code(s): E11.9 - TYPE 

2 DIABETES MELLITUS WITHOUT COMPLICATIONS   SNOMED Code(s): 12989575


   





(2) Dyspnea


Current Visit: Yes   Status: Acute   Priority: High   Code(s): R06.00 - DYSPNEA

, UNSPECIFIED   SNOMED Code(s): 872497478


   





(3) Pneumonia


Current Visit: Yes   Status: Acute   Priority: High   Code(s): J18.9 - PNEUMONIA

, UNSPECIFIED ORGANISM   SNOMED Code(s): 747368570


   


Plan: 





Going continue current antibiotic treatment.





Add nebulizer treatments for respiratory status.





Increase activity as tolerated.





Check CBC and CMP in a.m. per





Reconcile medications and sliding scale.





See orders otherwise.


Time with Patient: Greater than 30

## 2018-03-23 LAB
ALBUMIN SERPL-MCNC: 3 G/DL (ref 3.5–5)
ALP SERPL-CCNC: 58 U/L (ref 38–126)
ALT SERPL-CCNC: 29 U/L (ref 9–52)
ANION GAP SERPL CALC-SCNC: 9 MMOL/L
AST SERPL-CCNC: 21 U/L (ref 14–36)
BUN SERPL-SCNC: 25 MG/DL (ref 7–17)
CALCIUM SPEC-MCNC: 8.8 MG/DL (ref 8.4–10.2)
CHLORIDE SERPL-SCNC: 105 MMOL/L (ref 98–107)
CO2 SERPL-SCNC: 26 MMOL/L (ref 22–30)
ERYTHROCYTE [DISTWIDTH] IN BLOOD BY AUTOMATED COUNT: 3.98 M/UL (ref 3.8–5.4)
ERYTHROCYTE [DISTWIDTH] IN BLOOD: 13.3 % (ref 11.5–15.5)
GLUCOSE SERPL-MCNC: 130 MG/DL (ref 74–99)
HCT VFR BLD AUTO: 34.2 % (ref 34–46)
HGB BLD-MCNC: 11.4 GM/DL (ref 11.4–16)
MCH RBC QN AUTO: 28.5 PG (ref 25–35)
MCHC RBC AUTO-ENTMCNC: 33.2 G/DL (ref 31–37)
MCV RBC AUTO: 85.9 FL (ref 80–100)
PLATELET # BLD AUTO: 292 K/UL (ref 150–450)
POTASSIUM SERPL-SCNC: 4.9 MMOL/L (ref 3.5–5.1)
PROT SERPL-MCNC: 5.6 G/DL (ref 6.3–8.2)
SODIUM SERPL-SCNC: 140 MMOL/L (ref 137–145)
WBC # BLD AUTO: 7 K/UL (ref 3.8–10.6)

## 2018-03-23 RX ADMIN — PANTOPRAZOLE SODIUM SCH MG: 40 TABLET, DELAYED RELEASE ORAL at 07:47

## 2018-03-23 RX ADMIN — ATENOLOL SCH MG: 50 TABLET ORAL at 21:25

## 2018-03-23 RX ADMIN — DONEPEZIL HYDROCHLORIDE SCH MG: 10 TABLET ORAL at 21:25

## 2018-03-23 RX ADMIN — MEMANTINE HYDROCHLORIDE SCH MG: 5 TABLET ORAL at 21:25

## 2018-03-23 RX ADMIN — Medication SCH MG: at 21:25

## 2018-03-23 RX ADMIN — Medication SCH MG: at 07:47

## 2018-03-23 RX ADMIN — MEMANTINE HYDROCHLORIDE SCH MG: 5 TABLET ORAL at 07:47

## 2018-03-23 RX ADMIN — LOSARTAN POTASSIUM SCH MG: 25 TABLET, FILM COATED ORAL at 07:47

## 2018-03-23 RX ADMIN — ATENOLOL SCH MG: 50 TABLET ORAL at 07:47

## 2018-03-23 RX ADMIN — CEFTRIAXONE SODIUM SCH MG: 2 INJECTION, POWDER, FOR SOLUTION INTRAMUSCULAR; INTRAVENOUS at 07:48

## 2018-03-23 NOTE — P.PN
Subjective


Progress Note Date: 03/23/18


Principal diagnosis: 





Continue pneumonia treatment.  Continuing care.





This is a continue present on an 84-year-old white female with known history of 

diabetes and element of presbycusis.  The patient has been admitted essentially 

for left lower lobe pneumonia.  The patient is actually improved quite well and 

sitting up in a chair.  She has been walking in walker.  Therapy has been 

consulted.  Clinically much more improved.  No significant nausea, vomiting or 

diarrhea is stated.





Objective





- Vital Signs


Vital signs: 


 Vital Signs











Temp  98.4 F   03/23/18 06:31


 


Pulse  61   03/23/18 06:31


 


Resp  18   03/23/18 07:48


 


BP  149/74   03/23/18 06:31


 


Pulse Ox  95   03/23/18 06:31








 Intake & Output











 03/22/18 03/23/18 03/23/18





 18:59 06:59 18:59


 


Intake Total 300 300 


 


Balance 300 300 


 


Weight 65 kg  65 kg


 


Intake:   


 


  Oral 300 300 


 


Other:   


 


  Voiding Method Incontinent  Toilet





   Incontinent


 


  # Voids 3 3 


 


  # Bowel Movements 1 2 














- Constitutional


General appearance: Present: average body habitus, cooperative





- EENT


Eyes: Absent: abnormal pupil





- Respiratory


Respiratory: left: diminished





- Cardiovascular


Rhythm: regular


Heart sounds: normal: S1, S2


Abnormal Heart Sounds: Absent: S3 Gallop





- Gastrointestinal


General gastrointestinal: Present: soft.  Absent: tenderness





- Psychiatric


Psychiatric: Present: A&O x's 3.  Absent: appropriate affect





- Labs


CBC & Chem 7: 


 03/23/18 07:05





 03/23/18 07:05


Labs: 


 Abnormal Lab Results - Last 24 Hours (Table)











  03/22/18 03/23/18 Range/Units





  12:03 07:05 


 


BUN   25 H  (7-17)  mg/dL


 


Creatinine   1.06 H  (0.52-1.04)  mg/dL


 


Glucose   130 H  (74-99)  mg/dL


 


POC Glucose (mg/dL)  175 H   (75-99)  mg/dL


 


Total Protein   5.6 L  (6.3-8.2)  g/dL


 


Albumin   3.0 L  (3.5-5.0)  g/dL














Assessment and Plan


(1) Diabetes


Current Visit: Yes   Status: Chronic   Priority: Medium   Code(s): E11.9 - TYPE 

2 DIABETES MELLITUS WITHOUT COMPLICATIONS   SNOMED Code(s): 79173092


   





(2) Dyspnea


Current Visit: Yes   Status: Acute   Priority: High   Code(s): R06.00 - DYSPNEA

, UNSPECIFIED   SNOMED Code(s): 769334665


   





(3) Pneumonia


Current Visit: Yes   Status: Acute   Priority: High   Code(s): J18.9 - PNEUMONIA

, UNSPECIFIED ORGANISM   SNOMED Code(s): 908444993


   


Plan: 





Continue current regimen or treatment. 





Check CBC and CMP in a.m..





Anticipate discharge in the a.m.





Dr. Rollins's group will be covering for the weekend.





Prognosis is improved.


Time with Patient: Less than 30

## 2018-03-24 LAB
ALBUMIN SERPL-MCNC: 3.4 G/DL (ref 3.5–5)
ALP SERPL-CCNC: 68 U/L (ref 38–126)
ALT SERPL-CCNC: 31 U/L (ref 9–52)
ANION GAP SERPL CALC-SCNC: 13 MMOL/L
AST SERPL-CCNC: 28 U/L (ref 14–36)
BUN SERPL-SCNC: 28 MG/DL (ref 7–17)
CALCIUM SPEC-MCNC: 9.2 MG/DL (ref 8.4–10.2)
CHLORIDE SERPL-SCNC: 104 MMOL/L (ref 98–107)
CO2 SERPL-SCNC: 23 MMOL/L (ref 22–30)
ERYTHROCYTE [DISTWIDTH] IN BLOOD BY AUTOMATED COUNT: 4.5 M/UL (ref 3.8–5.4)
ERYTHROCYTE [DISTWIDTH] IN BLOOD: 13.4 % (ref 11.5–15.5)
GLUCOSE SERPL-MCNC: 163 MG/DL (ref 74–99)
HCT VFR BLD AUTO: 40.1 % (ref 34–46)
HGB BLD-MCNC: 12.6 GM/DL (ref 11.4–16)
MCH RBC QN AUTO: 28.1 PG (ref 25–35)
MCHC RBC AUTO-ENTMCNC: 31.5 G/DL (ref 31–37)
MCV RBC AUTO: 89 FL (ref 80–100)
PLATELET # BLD AUTO: 277 K/UL (ref 150–450)
POTASSIUM SERPL-SCNC: 5 MMOL/L (ref 3.5–5.1)
PROT SERPL-MCNC: 6.4 G/DL (ref 6.3–8.2)
SODIUM SERPL-SCNC: 140 MMOL/L (ref 137–145)
WBC # BLD AUTO: 8.6 K/UL (ref 3.8–10.6)

## 2018-03-24 RX ADMIN — Medication SCH MG: at 08:47

## 2018-03-24 RX ADMIN — DONEPEZIL HYDROCHLORIDE SCH MG: 10 TABLET ORAL at 20:49

## 2018-03-24 RX ADMIN — CEFTRIAXONE SODIUM SCH MG: 2 INJECTION, POWDER, FOR SOLUTION INTRAMUSCULAR; INTRAVENOUS at 08:47

## 2018-03-24 RX ADMIN — MEMANTINE HYDROCHLORIDE SCH MG: 5 TABLET ORAL at 20:49

## 2018-03-24 RX ADMIN — PANTOPRAZOLE SODIUM SCH MG: 40 TABLET, DELAYED RELEASE ORAL at 08:47

## 2018-03-24 RX ADMIN — ATENOLOL SCH MG: 50 TABLET ORAL at 08:47

## 2018-03-24 RX ADMIN — Medication SCH MG: at 20:48

## 2018-03-24 RX ADMIN — ATENOLOL SCH MG: 50 TABLET ORAL at 20:49

## 2018-03-24 RX ADMIN — MEMANTINE HYDROCHLORIDE SCH MG: 5 TABLET ORAL at 08:47

## 2018-03-24 RX ADMIN — LOSARTAN POTASSIUM SCH MG: 25 TABLET, FILM COATED ORAL at 08:47

## 2018-03-24 NOTE — PN
PROGRESS NOTE



DATE OF SERVICE:

03/24/2018



Patient seen in the room by bedside, sitting up in a chair.  She is in no acute

distress.



VITAL SIGNS:  Temperature of 98.4, pulse 62, respiration 18, blood pressure 136/66, O2

saturation 97% on room air. HEENT: Atraumatic, normocephalic.  Pupils equal and

reactive to light.  Extraocular movements intact.  Buccal mucosa is fair. Neck is

supple.  No goiter or lymphadenopathy.  JVD is negative.  No carotid bruit heard.

LUNGS: Scattered rhonchi with rales at the left lower lobe.  The heart is regular rate

and rhythm without any murmurs or gallop rhythm. Abdomen is soft, nontender,

nondistended.  Bowel sounds positive. EXTREMITIES: No edema, clubbing or cyanosis.



LABS:

CBC: White blood count 8.6, hemoglobin 12.6, hematocrit 41.4, and platelet count 277.

Chemical profile: Sodium 140, potassium 5.0, chloride 104, bicarb 23, BUN 28,

creatinine 0.9, glucose 163.



ASSESSMENT:

1. Community-acquired pneumonia.

2. Dyspnea possibly secondary to pneumonia.

3. Type 2 diabetes mellitus, fairly controlled.

The patient remains on IV antibiotics and on nebulizer treatment.  Will increase

activity with PT, OT and also for directions towards discharge planning.  We will

continue to monitor electrolytes.  Continue with sliding scale protocol.  Possible

discharge in next 24 hours.





MMODL / IJN: [id number] / Job#: [id number]

## 2018-03-25 LAB
GLUCOSE BLD-MCNC: 114 MG/DL (ref 75–99)
GLUCOSE BLD-MCNC: 175 MG/DL (ref 75–99)
GLUCOSE BLD-MCNC: 236 MG/DL (ref 75–99)

## 2018-03-25 RX ADMIN — ATENOLOL SCH MG: 50 TABLET ORAL at 08:10

## 2018-03-25 RX ADMIN — Medication SCH MG: at 21:14

## 2018-03-25 RX ADMIN — MEMANTINE HYDROCHLORIDE SCH MG: 5 TABLET ORAL at 21:14

## 2018-03-25 RX ADMIN — DONEPEZIL HYDROCHLORIDE SCH MG: 10 TABLET ORAL at 21:14

## 2018-03-25 RX ADMIN — PANTOPRAZOLE SODIUM SCH MG: 40 TABLET, DELAYED RELEASE ORAL at 08:10

## 2018-03-25 RX ADMIN — MEMANTINE HYDROCHLORIDE SCH MG: 5 TABLET ORAL at 08:10

## 2018-03-25 RX ADMIN — ATENOLOL SCH MG: 50 TABLET ORAL at 21:14

## 2018-03-25 RX ADMIN — CEFTRIAXONE SODIUM SCH MG: 2 INJECTION, POWDER, FOR SOLUTION INTRAMUSCULAR; INTRAVENOUS at 08:11

## 2018-03-25 RX ADMIN — LOSARTAN POTASSIUM SCH MG: 25 TABLET, FILM COATED ORAL at 08:11

## 2018-03-25 RX ADMIN — Medication SCH MG: at 08:09

## 2018-03-26 VITALS — SYSTOLIC BLOOD PRESSURE: 137 MMHG | RESPIRATION RATE: 20 BRPM | HEART RATE: 66 BPM | DIASTOLIC BLOOD PRESSURE: 70 MMHG

## 2018-03-26 VITALS — TEMPERATURE: 97.8 F

## 2018-03-26 LAB — GLUCOSE BLD-MCNC: 141 MG/DL (ref 75–99)

## 2018-03-26 RX ADMIN — ATENOLOL SCH MG: 50 TABLET ORAL at 07:46

## 2018-03-26 RX ADMIN — MEMANTINE HYDROCHLORIDE SCH MG: 5 TABLET ORAL at 07:47

## 2018-03-26 RX ADMIN — LOSARTAN POTASSIUM SCH MG: 25 TABLET, FILM COATED ORAL at 07:47

## 2018-03-26 RX ADMIN — Medication SCH MG: at 07:46

## 2018-03-26 RX ADMIN — PANTOPRAZOLE SODIUM SCH MG: 40 TABLET, DELAYED RELEASE ORAL at 07:47

## 2018-03-26 RX ADMIN — CEFTRIAXONE SODIUM SCH MG: 2 INJECTION, POWDER, FOR SOLUTION INTRAMUSCULAR; INTRAVENOUS at 07:46

## 2018-03-26 NOTE — P.DS
Providers


Date of admission: 


03/21/18 15:23





Attending physician: 


Cipriano Burleson





Primary care physician: 


Cipriano Burleson








- Discharge Diagnosis(es)


(1) Diabetes


Current Visit: Yes   Status: Chronic   Priority: Medium   





(2) Dyspnea


Current Visit: Yes   Status: Acute   Priority: High   





(3) Pneumonia


Current Visit: Yes   Status: Acute   Priority: High   


Hospital Course: 





This is a discharge summary a 4-year-old white female essentially negative for 

left lower lobe pneumonia.  The patient was treated with community acquired 

protocol and did quite well.  No significant fever or chills on discharge are 

noted.  She had a little bit of diarrhea.  We will checks Clostridium difficile 

just to make sure she has not developed this.  If this is negative, we will go 

ahead and discharge today and follow-up in about 2 weeks.


Patient Condition at Discharge: Good





Plan - Discharge Summary


Discharge Rx Participant: No


New Discharge Prescriptions: 


New


   Cefuroxime Axetil [Ceftin] 500 mg PO BID #14 tab





Continue


   metFORMIN HCL [Glucophage] 500 mg PO AC-BID


   amLODIPine [Norvasc] 10 mg PO DAILY


   Donepezil [Aricept] 10 mg PO HS


   Losartan Potassium 75 mg PO DAILY


   Magnesium Oxide [Mag-Ox] 400 mg PO BID


   glipiZIDE [Glucotrol] 10 mg PO AC-BID


   Pantoprazole [Protonix] 40 mg PO DAILY


   Memantine [Namenda] 5 mg PO BID


   Atenolol [Tenormin] 50 mg PO BID


Discharge Medication List





Donepezil [Aricept] 10 mg PO HS 10/08/17 [History]


Losartan Potassium 75 mg PO DAILY 10/08/17 [History]


amLODIPine [Norvasc] 10 mg PO DAILY 10/08/17 [History]


metFORMIN HCL [Glucophage] 500 mg PO AC-BID 10/08/17 [History]


Atenolol [Tenormin] 50 mg PO BID 03/21/18 [History]


Magnesium Oxide [Mag-Ox] 400 mg PO BID 03/21/18 [History]


Memantine [Namenda] 5 mg PO BID 03/21/18 [History]


Pantoprazole [Protonix] 40 mg PO DAILY 03/21/18 [History]


glipiZIDE [Glucotrol] 10 mg PO AC-BID 03/21/18 [History]


Cefuroxime Axetil [Ceftin] 500 mg PO BID #14 tab 03/25/18 [Rx]








Follow up Appointment(s)/Referral(s): 


Cipriano Burleson MD [Primary Care Provider] - 1 Week


Patient Instructions/Handouts:  Pneumonia (DC)


Activity/Diet/Wound Care/Special Instructions: 


Cardiac, diabetic diet.





Activity as tolerated, fall precautions. 


Discharge Disposition: HOME SELF-CARE

## 2018-07-04 ENCOUNTER — HOSPITAL ENCOUNTER (EMERGENCY)
Dept: HOSPITAL 47 - EC | Age: 83
Discharge: HOME | End: 2018-07-04
Payer: MEDICARE

## 2018-07-04 VITALS
TEMPERATURE: 98 F | HEART RATE: 60 BPM | RESPIRATION RATE: 18 BRPM | DIASTOLIC BLOOD PRESSURE: 74 MMHG | SYSTOLIC BLOOD PRESSURE: 162 MMHG

## 2018-07-04 DIAGNOSIS — Z90.49: ICD-10-CM

## 2018-07-04 DIAGNOSIS — Z79.84: ICD-10-CM

## 2018-07-04 DIAGNOSIS — E11.9: ICD-10-CM

## 2018-07-04 DIAGNOSIS — I10: ICD-10-CM

## 2018-07-04 DIAGNOSIS — F03.90: ICD-10-CM

## 2018-07-04 DIAGNOSIS — K57.92: Primary | ICD-10-CM

## 2018-07-04 DIAGNOSIS — Z79.899: ICD-10-CM

## 2018-07-04 LAB
ALBUMIN SERPL-MCNC: 4 G/DL (ref 3.5–5)
ALP SERPL-CCNC: 71 U/L (ref 38–126)
ALT SERPL-CCNC: 25 U/L (ref 9–52)
AMYLASE SERPL-CCNC: 48 U/L (ref 30–110)
ANION GAP SERPL CALC-SCNC: 11 MMOL/L
AST SERPL-CCNC: 27 U/L (ref 14–36)
BASOPHILS # BLD AUTO: 0 K/UL (ref 0–0.2)
BASOPHILS NFR BLD AUTO: 0 %
BUN SERPL-SCNC: 20 MG/DL (ref 7–17)
CALCIUM SPEC-MCNC: 9.5 MG/DL (ref 8.4–10.2)
CHLORIDE SERPL-SCNC: 101 MMOL/L (ref 98–107)
CO2 SERPL-SCNC: 27 MMOL/L (ref 22–30)
EOSINOPHIL # BLD AUTO: 0.4 K/UL (ref 0–0.7)
EOSINOPHIL NFR BLD AUTO: 6 %
ERYTHROCYTE [DISTWIDTH] IN BLOOD BY AUTOMATED COUNT: 4.61 M/UL (ref 3.8–5.4)
ERYTHROCYTE [DISTWIDTH] IN BLOOD: 14.9 % (ref 11.5–15.5)
GLUCOSE SERPL-MCNC: 163 MG/DL (ref 74–99)
GLUCOSE UR QL: (no result)
HCT VFR BLD AUTO: 39.5 % (ref 34–46)
HGB BLD-MCNC: 12.9 GM/DL (ref 11.4–16)
LIPASE SERPL-CCNC: 93 U/L (ref 23–300)
LYMPHOCYTES # SPEC AUTO: 1.7 K/UL (ref 1–4.8)
LYMPHOCYTES NFR SPEC AUTO: 23 %
MCH RBC QN AUTO: 27.9 PG (ref 25–35)
MCHC RBC AUTO-ENTMCNC: 32.6 G/DL (ref 31–37)
MCV RBC AUTO: 85.7 FL (ref 80–100)
MONOCYTES # BLD AUTO: 0.5 K/UL (ref 0–1)
MONOCYTES NFR BLD AUTO: 7 %
NEUTROPHILS # BLD AUTO: 4.6 K/UL (ref 1.3–7.7)
NEUTROPHILS NFR BLD AUTO: 62 %
PH UR: 7 [PH] (ref 5–8)
PLATELET # BLD AUTO: 284 K/UL (ref 150–450)
POTASSIUM SERPL-SCNC: 4.5 MMOL/L (ref 3.5–5.1)
PROT SERPL-MCNC: 6.8 G/DL (ref 6.3–8.2)
PROT UR QL: (no result)
RBC UR QL: 1 /HPF (ref 0–5)
SODIUM SERPL-SCNC: 139 MMOL/L (ref 137–145)
SP GR UR: 1.01 (ref 1–1.03)
SQUAMOUS UR QL AUTO: <1 /HPF (ref 0–4)
UROBILINOGEN UR QL STRIP: <2 MG/DL (ref ?–2)
WBC # BLD AUTO: 7.4 K/UL (ref 3.8–10.6)
WBC #/AREA URNS HPF: 7 /HPF (ref 0–5)

## 2018-07-04 PROCEDURE — 85025 COMPLETE CBC W/AUTO DIFF WBC: CPT

## 2018-07-04 PROCEDURE — 36415 COLL VENOUS BLD VENIPUNCTURE: CPT

## 2018-07-04 PROCEDURE — 96360 HYDRATION IV INFUSION INIT: CPT

## 2018-07-04 PROCEDURE — 87086 URINE CULTURE/COLONY COUNT: CPT

## 2018-07-04 PROCEDURE — 99284 EMERGENCY DEPT VISIT MOD MDM: CPT

## 2018-07-04 PROCEDURE — 82150 ASSAY OF AMYLASE: CPT

## 2018-07-04 PROCEDURE — 81001 URINALYSIS AUTO W/SCOPE: CPT

## 2018-07-04 PROCEDURE — 74176 CT ABD & PELVIS W/O CONTRAST: CPT

## 2018-07-04 PROCEDURE — 80053 COMPREHEN METABOLIC PANEL: CPT

## 2018-07-04 PROCEDURE — 83690 ASSAY OF LIPASE: CPT

## 2018-07-04 NOTE — CT
EXAMINATION TYPE: CT abdomen pelvis wo con

 

DATE OF EXAM: 7/4/2018

 

COMPARISON: Prior CT study dated 10/8/2017.

 

HISTORY: LLQ pain

 

CT DLP: 472.3 mGycm

Automated exposure control for dose reduction was used.

 

TECHNIQUE:  Helical acquisition of images was performed from the lung bases through the pelvis.

 

FINDINGS: 

 

LUNG BASES: No significant abnormality is appreciated.

 

LIVER/GB: No significant abnormality is appreciated. Gallbladder is still not visualized.

 

PANCREAS: No significant abnormality is seen.

 

SPLEEN: No significant abnormality is seen.

 

ADRENALS: No significant abnormality is seen.

 

KIDNEYS: No significant abnormality is seen.

 

 

FREE AIR:  No free air is visualized

 

RETROPERITONEAL ADENOPATHY:  None visualized

 

REPRODUCTIVE ORGANS: No significant abnormality is seen

 

URINARY BLADDER:  No significant abnormality is seen.

 

PELVIC ADENOPATHY:  None visualized.

 

OSSEOUS STRUCTURES:  No significant abnormality is seen.

 

BOWEL: Appendix is not visualized. No abnormal attenuation of the pericecal fat. No free air or bowel
 obstruction. Scattered colonic diverticula are noted without abscess formation. Slight increased att
enuation of the mesenteric fat within the sigmoid region suggestive of diverticulitis. No drainable a
bscess.

 

OTHER: Arthritic changes within the spine with osteophytic spur formation and vacuum film in about mu
ltiple intervertebral disc spaces. Arthritic change in both hip joints.

 

IMPRESSION: 

STABLE FINDINGS AS COMPARED WITH THE PRIOR CT STUDY WITH EVIDENCE OF DIVERTICULOSIS. SLIGHT INCREASED
 ATTENUATION OF THE MESENTERIC FAT WITHIN THE SIGMOID REGION INDICATING INFLAMMATORY CHANGES OF DIVER
TICULITIS. NO DRAINABLE ABSCESS. NO UNUSUAL FLUID COLLECTION. APPENDIX IS NOT VISIBLE. NO ABNORMAL AT
TENUATION OF THE PERICECAL FAT.

ARTHRITIC CHANGES WITHIN THE SPINE WITH VACUUM PHENOMENON AND OSTEOPHYTIC SPUR FORMATION. ARTHRITIC C
HANGES WITHIN THE HIP JOINTS. No free air or bowel obstruction.

## 2018-07-24 ENCOUNTER — HOSPITAL ENCOUNTER (INPATIENT)
Dept: HOSPITAL 47 - EC | Age: 83
LOS: 4 days | Discharge: NURSING, REHAB OR LONG-TERM CARE | DRG: 884 | End: 2018-07-28
Attending: FAMILY MEDICINE | Admitting: FAMILY MEDICINE
Payer: MEDICARE

## 2018-07-24 VITALS — BODY MASS INDEX: 28.3 KG/M2

## 2018-07-24 VITALS — RESPIRATION RATE: 16 BRPM

## 2018-07-24 DIAGNOSIS — F01.50: Primary | ICD-10-CM

## 2018-07-24 DIAGNOSIS — N39.0: ICD-10-CM

## 2018-07-24 DIAGNOSIS — E11.9: ICD-10-CM

## 2018-07-24 DIAGNOSIS — F02.80: ICD-10-CM

## 2018-07-24 DIAGNOSIS — Z79.899: ICD-10-CM

## 2018-07-24 DIAGNOSIS — Z87.440: ICD-10-CM

## 2018-07-24 DIAGNOSIS — Z79.84: ICD-10-CM
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
## 2018-07-24 DIAGNOSIS — R26.9: ICD-10-CM

## 2018-07-24 DIAGNOSIS — R29.6: ICD-10-CM

## 2018-07-24 DIAGNOSIS — E86.0: ICD-10-CM

## 2018-07-24 DIAGNOSIS — I10: ICD-10-CM

## 2018-07-24 DIAGNOSIS — G30.9: ICD-10-CM

## 2018-07-24 DIAGNOSIS — F03.90: ICD-10-CM

## 2018-07-24 DIAGNOSIS — Z90.49: ICD-10-CM

## 2018-07-24 LAB
ALBUMIN SERPL-MCNC: 3.3 G/DL (ref 3.5–5)
ALP SERPL-CCNC: 62 U/L (ref 38–126)
ALT SERPL-CCNC: 35 U/L (ref 9–52)
ANION GAP SERPL CALC-SCNC: 6 MMOL/L
AST SERPL-CCNC: 32 U/L (ref 14–36)
BASOPHILS # BLD AUTO: 0 K/UL (ref 0–0.2)
BASOPHILS NFR BLD AUTO: 0 %
BUN SERPL-SCNC: 28 MG/DL (ref 7–17)
CALCIUM SPEC-MCNC: 8.9 MG/DL (ref 8.4–10.2)
CHLORIDE SERPL-SCNC: 102 MMOL/L (ref 98–107)
CO2 SERPL-SCNC: 29 MMOL/L (ref 22–30)
EOSINOPHIL # BLD AUTO: 0.2 K/UL (ref 0–0.7)
EOSINOPHIL NFR BLD AUTO: 2 %
ERYTHROCYTE [DISTWIDTH] IN BLOOD BY AUTOMATED COUNT: 4.48 M/UL (ref 3.8–5.4)
ERYTHROCYTE [DISTWIDTH] IN BLOOD: 14.9 % (ref 11.5–15.5)
GLUCOSE SERPL-MCNC: 237 MG/DL (ref 74–99)
GLUCOSE UR QL: (no result)
HCT VFR BLD AUTO: 38.1 % (ref 34–46)
HGB BLD-MCNC: 12.1 GM/DL (ref 11.4–16)
LYMPHOCYTES # SPEC AUTO: 1.2 K/UL (ref 1–4.8)
LYMPHOCYTES NFR SPEC AUTO: 13 %
MAGNESIUM SPEC-SCNC: 2.2 MG/DL (ref 1.6–2.3)
MCH RBC QN AUTO: 27 PG (ref 25–35)
MCHC RBC AUTO-ENTMCNC: 31.6 G/DL (ref 31–37)
MCV RBC AUTO: 85.2 FL (ref 80–100)
MONOCYTES # BLD AUTO: 0.7 K/UL (ref 0–1)
MONOCYTES NFR BLD AUTO: 7 %
NEUTROPHILS # BLD AUTO: 6.9 K/UL (ref 1.3–7.7)
NEUTROPHILS NFR BLD AUTO: 75 %
PH UR: 7.5 [PH] (ref 5–8)
PLATELET # BLD AUTO: 327 K/UL (ref 150–450)
POTASSIUM SERPL-SCNC: 5.2 MMOL/L (ref 3.5–5.1)
PROT SERPL-MCNC: 5.8 G/DL (ref 6.3–8.2)
PROT UR QL: (no result)
RBC UR QL: 8 /HPF (ref 0–5)
SODIUM SERPL-SCNC: 137 MMOL/L (ref 137–145)
SP GR UR: 1.01 (ref 1–1.03)
UROBILINOGEN UR QL STRIP: <2 MG/DL (ref ?–2)
WBC # BLD AUTO: 9.1 K/UL (ref 3.8–10.6)
WBC #/AREA URNS HPF: 39 /HPF (ref 0–5)

## 2018-07-24 PROCEDURE — 36415 COLL VENOUS BLD VENIPUNCTURE: CPT

## 2018-07-24 PROCEDURE — 83036 HEMOGLOBIN GLYCOSYLATED A1C: CPT

## 2018-07-24 PROCEDURE — 72170 X-RAY EXAM OF PELVIS: CPT

## 2018-07-24 PROCEDURE — 72125 CT NECK SPINE W/O DYE: CPT

## 2018-07-24 PROCEDURE — 85025 COMPLETE CBC W/AUTO DIFF WBC: CPT

## 2018-07-24 PROCEDURE — 87040 BLOOD CULTURE FOR BACTERIA: CPT

## 2018-07-24 PROCEDURE — 99285 EMERGENCY DEPT VISIT HI MDM: CPT

## 2018-07-24 PROCEDURE — 80053 COMPREHEN METABOLIC PANEL: CPT

## 2018-07-24 PROCEDURE — 71046 X-RAY EXAM CHEST 2 VIEWS: CPT

## 2018-07-24 PROCEDURE — 83735 ASSAY OF MAGNESIUM: CPT

## 2018-07-24 PROCEDURE — 84484 ASSAY OF TROPONIN QUANT: CPT

## 2018-07-24 PROCEDURE — 93005 ELECTROCARDIOGRAM TRACING: CPT

## 2018-07-24 PROCEDURE — 81001 URINALYSIS AUTO W/SCOPE: CPT

## 2018-07-24 PROCEDURE — 87086 URINE CULTURE/COLONY COUNT: CPT

## 2018-07-24 PROCEDURE — 80048 BASIC METABOLIC PNL TOTAL CA: CPT

## 2018-07-24 PROCEDURE — 70450 CT HEAD/BRAIN W/O DYE: CPT

## 2018-07-24 RX ADMIN — CEFAZOLIN SCH MLS/HR: 330 INJECTION, POWDER, FOR SOLUTION INTRAMUSCULAR; INTRAVENOUS at 21:05

## 2018-07-24 RX ADMIN — Medication SCH MG: at 21:49

## 2018-07-24 RX ADMIN — MEMANTINE HYDROCHLORIDE SCH MG: 5 TABLET ORAL at 21:49

## 2018-07-24 RX ADMIN — ATENOLOL SCH MG: 50 TABLET ORAL at 21:49

## 2018-07-24 NOTE — ED
General Adult HPI





- General


Chief complaint: Fall


Stated complaint: Fall


Time Seen by Provider: 07/24/18 17:12


Source: patient, EMS, RN notes reviewed, old records reviewed


Mode of arrival: EMS


Limitations: altered mental status





- History of Present Illness


Initial comments: 





84-year-old female with history of dementia presents with fall and right 

shoulder injury.  Patient states she slipped today falling onto her right 

shoulder.  There is no head or neck trauma.  Patient is not on anticoagulation.

  She is accompanied by her sons who state that over the past several days she 

is fallen 5 times.  She's had increasing weakness.  Patient currently lives 

alone and her sons are quite concerned.  No history of vomiting or diarrhea.  

No complaints of chest pain or abdominal pain.  No fever.  Denies headache.  

Denies any focal weakness or numbness.





- Related Data


 Home Medications











 Medication  Instructions  Recorded  Confirmed


 


Donepezil [Aricept] 10 mg PO HS 10/08/17 07/24/18


 


Losartan Potassium 75 mg PO DAILY 10/08/17 07/24/18


 


amLODIPine [Norvasc] 10 mg PO DAILY 10/08/17 07/24/18


 


metFORMIN HCL [Glucophage] 500 mg PO AC-BID 10/08/17 07/24/18


 


Atenolol [Tenormin] 50 mg PO BID 03/21/18 07/24/18


 


Magnesium Oxide [Mag-Ox] 400 mg PO BID 03/21/18 07/24/18


 


Memantine [Namenda] 5 mg PO BID 03/21/18 07/24/18


 


Pantoprazole [Protonix] 40 mg PO DAILY 03/21/18 07/24/18


 


glipiZIDE [Glucotrol] 10 mg PO AC-BID 03/21/18 07/24/18


 


sitaGLIPtin [Januvia] 100 mg PO DAILY 07/24/18 07/24/18











 Allergies











Allergy/AdvReac Type Severity Reaction Status Date / Time


 


No Known Allergies Allergy   Verified 07/24/18 18:33














Review of Systems


ROS Statement: 


Those systems with pertinent positive or pertinent negative responses have been 

documented in the HPI.





ROS Other: All systems not noted in ROS Statement are negative.





Past Medical History


Past Medical History: Dementia, Diabetes Mellitus, Hypertension


Additional Past Medical History / Comment(s): uti-ecoli


History of Any Multi-Drug Resistant Organisms: ESBL


Date of last positivie culture/infection: 10/8/17


MDRO Source:: URINE ESBL


Past Surgical History: Cholecystectomy


Past Anesthesia/Blood Transfusion Reactions: No Reported Reaction


Past Psychological History: No Psychological Hx Reported


Smoking Status: Never smoker


Past Alcohol Use History: None Reported


Past Drug Use History: None Reported





- Past Family History


  ** Father


History Unknown: Yes





  ** Mother


History Unknown: Yes





General Exam


Limitations: altered mental status


General appearance: alert, in no apparent distress


Head exam: Present: atraumatic, normocephalic


Eye exam: Present: normal appearance, PERRL


ENT exam: Present: mucous membranes dry


Neck exam: Present: normal inspection, full ROM.  Absent: tenderness, 

meningismus


Respiratory exam: Present: normal lung sounds bilaterally.  Absent: respiratory 

distress


Cardiovascular Exam: Present: regular rate, normal rhythm


GI/Abdominal exam: Present: soft.  Absent: distended, tenderness, guarding, 

rebound


Extremities exam: Present: normal inspection, normal capillary refill


Back exam: Present: other ( ecchymosis over the right posterior shoulder, no 

bony tenderness)


Neurological exam: Present: alert, oriented X3


Psychiatric exam: Present: normal affect, normal mood


Skin exam: Present: warm, dry, intact.  Absent: cyanosis, diaphoretic





Course


 Vital Signs











  07/24/18 07/24/18





  17:02 19:47


 


Temperature 97.7 F 98.4 F


 


Pulse Rate 57 L 54 L


 


Respiratory 18 18





Rate  


 


Blood Pressure 147/76 152/67


 


O2 Sat by Pulse 97 96





Oximetry  














EKG Findings





- EKG Comments:


EKG Findings:: EKG: Sinus bradycardia, rate of 54, MT interval 150, QRS 

duration 82, , no ST segment changes





Medical Decision Making





- Medical Decision Making





84-year-old presenting with multiple falls.  X-rays obtained of the chest which 

is negative for acute cardiopulmonary disease or acute bony abnormality.  X-ray 

of the shoulder negative for fracture or dislocation, x-ray of the pelvis 

negative for fracture or dislocation, x-ray of the right wrist where there is 

some ecchymosis is negative for acute fracture.  CT of the head negative for 

intracranial hemorrhage or mass effect, CT cervical spine negative for fracture 

or subluxation.  CBC, CMP is unremarkable.  UA does show some signs of urinary 

tract infection.  Urine culture is obtained and patient is started on 

antibiotics.  She will be admitted for further evaluation and treatment.  Case 

discussed with Dr. Burleson who states except admission.





- Lab Data


Result diagrams: 


 07/24/18 18:35





 07/24/18 18:35


 Lab Results











  07/24/18 07/24/18 07/24/18 Range/Units





  18:35 18:35 18:35 


 


WBC  9.1    (3.8-10.6)  k/uL


 


RBC  4.48    (3.80-5.40)  m/uL


 


Hgb  12.1    (11.4-16.0)  gm/dL


 


Hct  38.1    (34.0-46.0)  %


 


MCV  85.2    (80.0-100.0)  fL


 


MCH  27.0    (25.0-35.0)  pg


 


MCHC  31.6    (31.0-37.0)  g/dL


 


RDW  14.9    (11.5-15.5)  %


 


Plt Count  327    (150-450)  k/uL


 


Neutrophils %  75    %


 


Lymphocytes %  13    %


 


Monocytes %  7    %


 


Eosinophils %  2    %


 


Basophils %  0    %


 


Neutrophils #  6.9    (1.3-7.7)  k/uL


 


Lymphocytes #  1.2    (1.0-4.8)  k/uL


 


Monocytes #  0.7    (0-1.0)  k/uL


 


Eosinophils #  0.2    (0-0.7)  k/uL


 


Basophils #  0.0    (0-0.2)  k/uL


 


Sodium   137   (137-145)  mmol/L


 


Potassium   5.2 H   (3.5-5.1)  mmol/L


 


Chloride   102   ()  mmol/L


 


Carbon Dioxide   29   (22-30)  mmol/L


 


Anion Gap   6   mmol/L


 


BUN   28 H   (7-17)  mg/dL


 


Creatinine   0.99   (0.52-1.04)  mg/dL


 


Est GFR (CKD-EPI)AfAm   61   (>60 ml/min/1.73 sqM)  


 


Est GFR (CKD-EPI)NonAf   53   (>60 ml/min/1.73 sqM)  


 


Glucose   237 H   (74-99)  mg/dL


 


Calcium   8.9   (8.4-10.2)  mg/dL


 


Magnesium   2.2   (1.6-2.3)  mg/dL


 


Total Bilirubin   0.3   (0.2-1.3)  mg/dL


 


AST   32   (14-36)  U/L


 


ALT   35   (9-52)  U/L


 


Alkaline Phosphatase   62   ()  U/L


 


Troponin I    <0.012  (0.000-0.034)  ng/mL


 


Total Protein   5.8 L   (6.3-8.2)  g/dL


 


Albumin   3.3 L   (3.5-5.0)  g/dL


 


Urine Color     


 


Urine Appearance     (Clear)  


 


Urine pH     (5.0-8.0)  


 


Ur Specific Gravity     (1.001-1.035)  


 


Urine Protein     (Negative)  


 


Urine Glucose (UA)     (Negative)  


 


Urine Ketones     (Negative)  


 


Urine Blood     (Negative)  


 


Urine Nitrite     (Negative)  


 


Urine Bilirubin     (Negative)  


 


Urine Urobilinogen     (<2.0)  mg/dL


 


Ur Leukocyte Esterase     (Negative)  


 


Urine RBC     (0-5)  /hpf


 


Urine WBC     (0-5)  /hpf














  07/24/18 Range/Units





  19:43 


 


WBC   (3.8-10.6)  k/uL


 


RBC   (3.80-5.40)  m/uL


 


Hgb   (11.4-16.0)  gm/dL


 


Hct   (34.0-46.0)  %


 


MCV   (80.0-100.0)  fL


 


MCH   (25.0-35.0)  pg


 


MCHC   (31.0-37.0)  g/dL


 


RDW   (11.5-15.5)  %


 


Plt Count   (150-450)  k/uL


 


Neutrophils %   %


 


Lymphocytes %   %


 


Monocytes %   %


 


Eosinophils %   %


 


Basophils %   %


 


Neutrophils #   (1.3-7.7)  k/uL


 


Lymphocytes #   (1.0-4.8)  k/uL


 


Monocytes #   (0-1.0)  k/uL


 


Eosinophils #   (0-0.7)  k/uL


 


Basophils #   (0-0.2)  k/uL


 


Sodium   (137-145)  mmol/L


 


Potassium   (3.5-5.1)  mmol/L


 


Chloride   ()  mmol/L


 


Carbon Dioxide   (22-30)  mmol/L


 


Anion Gap   mmol/L


 


BUN   (7-17)  mg/dL


 


Creatinine   (0.52-1.04)  mg/dL


 


Est GFR (CKD-EPI)AfAm   (>60 ml/min/1.73 sqM)  


 


Est GFR (CKD-EPI)NonAf   (>60 ml/min/1.73 sqM)  


 


Glucose   (74-99)  mg/dL


 


Calcium   (8.4-10.2)  mg/dL


 


Magnesium   (1.6-2.3)  mg/dL


 


Total Bilirubin   (0.2-1.3)  mg/dL


 


AST   (14-36)  U/L


 


ALT   (9-52)  U/L


 


Alkaline Phosphatase   ()  U/L


 


Troponin I   (0.000-0.034)  ng/mL


 


Total Protein   (6.3-8.2)  g/dL


 


Albumin   (3.5-5.0)  g/dL


 


Urine Color  Yellow  


 


Urine Appearance  Clear  (Clear)  


 


Urine pH  7.5  (5.0-8.0)  


 


Ur Specific Gravity  1.013  (1.001-1.035)  


 


Urine Protein  2+ H  (Negative)  


 


Urine Glucose (UA)  4+ H  (Negative)  


 


Urine Ketones  Negative  (Negative)  


 


Urine Blood  Negative  (Negative)  


 


Urine Nitrite  Negative  (Negative)  


 


Urine Bilirubin  Negative  (Negative)  


 


Urine Urobilinogen  <2.0  (<2.0)  mg/dL


 


Ur Leukocyte Esterase  Large H  (Negative)  


 


Urine RBC  8 H  (0-5)  /hpf


 


Urine WBC  39 H  (0-5)  /hpf














Disposition


Clinical Impression: 


 Fall, UTI (urinary tract infection), Multiple falls





Disposition: ADMITTED AS IP TO THIS [facility]


Condition: Stable


Is patient prescribed a controlled substance at d/c from ED?: No


Referrals: 


Cipriano Burleson MD [Primary Care Provider] - 1-2 days


Decision to Admit Reason: Admit from EC


Decision Date: 07/24/18


Decision Time: 20:36

## 2018-07-24 NOTE — XR
EXAMINATION TYPE: XR chest 2V

 

DATE OF EXAM: 7/24/2018

 

COMPARISON: 3/21/2018

 

HISTORY: Weakness

 

TECHNIQUE:  Frontal and lateral views of the chest are obtained.

 

FINDINGS:  There is no heart failure nor confluent pneumonic infiltrate. Costophrenic angles are gracie
r. Heart size is normal. Thoracic aorta is atheromatous. There is no sign of pleural effusion.

 

IMPRESSION:  No active cardiopulmonary disease. There is clearing of left lower lobe pneumonia compar
ed to old exam.

## 2018-07-24 NOTE — CT
EXAMINATION TYPE: CT brain cspine wo con

 

DATE OF EXAM: 7/24/2018

 

COMPARISON: None

 

HISTORY: Multiple fall injuries headache and neck pain

 

CT DLP: 1542.5 mGycm

Automated exposure control for dose reduction was used.

 

TECHNIQUE: CT scan of the head and cervical spine are performed without contrast.

 

FINDINGS:   There is cerebral cortical atrophy. There is no mass effect nor midline shift. There is n
o sign of intracranial hemorrhage. The calvarium is intact.

 

There is straightening of the cervical spine. There is degenerative disc space narrowing at C5-6 C6-7
 with spurring of the endplates. There is multilevel hypertrophic cervical facet arthropathy. The [facility]
ll base is intact.

 

IMPRESSION:

Cerebral atrophy. No acute intracranial abnormality.

 

Spondylotic changes in the cervical spine. No fracture.

## 2018-07-24 NOTE — XR
EXAMINATION TYPE: XR shoulder complete RT

 

DATE OF EXAM: 7/24/2018

 

COMPARISON: NONE

 

HISTORY: Shoulder pain

 

TECHNIQUE: 3 views

 

FINDINGS: There is narrowing and moderate spurring at the glenohumeral joint. I see no fracture nor d
islocation. Acromioclavicular joint is intact.

 

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 No fracture.

## 2018-07-24 NOTE — XR
EXAMINATION TYPE: XR pelvis AP view

 

DATE OF EXAM: 7/24/2018

 

COMPARISON: NONE

 

HISTORY: Pain

 

TECHNIQUE: Single view

 

FINDINGS: Pelvic ring is intact. There is spurring of the acetabula. There is severe narrowing of lef
t hip joint space. Sacroiliac joints are intact. There is mild narrowing right hip joint space.

 

IMPRESSION: No acute abnormality of the pelvis. No fracture.

## 2018-07-24 NOTE — XR
EXAMINATION TYPE: XR wrist complete RT

 

DATE OF EXAM: 7/24/2018

 

COMPARISON: NONE

 

HISTORY: Wrist pain

 

TECHNIQUE: 4 views

 

FINDINGS: I see no fracture nor dislocation. There is vascular calcification. Joint spaces are fairly
 normal. There is minor spurring at the first carpometacarpal joint.

 

IMPRESSION: Negative right wrist exam.

## 2018-07-25 LAB
GLUCOSE BLD-MCNC: 246 MG/DL (ref 75–99)
GLUCOSE BLD-MCNC: 290 MG/DL (ref 75–99)
GLUCOSE BLD-MCNC: 335 MG/DL (ref 75–99)

## 2018-07-25 RX ADMIN — Medication SCH MG: at 08:21

## 2018-07-25 RX ADMIN — ATENOLOL SCH MG: 50 TABLET ORAL at 21:49

## 2018-07-25 RX ADMIN — INSULIN ASPART SCH UNIT: 100 INJECTION, SOLUTION INTRAVENOUS; SUBCUTANEOUS at 17:39

## 2018-07-25 RX ADMIN — INSULIN ASPART SCH UNIT: 100 INJECTION, SOLUTION INTRAVENOUS; SUBCUTANEOUS at 21:50

## 2018-07-25 RX ADMIN — MEMANTINE HYDROCHLORIDE SCH MG: 5 TABLET ORAL at 08:22

## 2018-07-25 RX ADMIN — ATENOLOL SCH MG: 50 TABLET ORAL at 08:21

## 2018-07-25 RX ADMIN — CEFAZOLIN SCH: 330 INJECTION, POWDER, FOR SOLUTION INTRAMUSCULAR; INTRAVENOUS at 16:09

## 2018-07-25 RX ADMIN — Medication SCH MG: at 21:50

## 2018-07-25 RX ADMIN — MEMANTINE HYDROCHLORIDE SCH MG: 5 TABLET ORAL at 21:50

## 2018-07-25 NOTE — P.HPIM
History of Present Illness





84-year-old female with history of dementia presents with fall and right 

shoulder injury.  Patient states she slipped today falling onto her right 

shoulder.  There is no head or neck trauma.  Patient is not on anticoagulation.

  She is accompanied by her sons who state that over the past several days she 

is fallen 5 times.  She's had increasing weakness.  Patient currently lives 

alone and her sons are quite concerned.  No history of vomiting or diarrhea.  

No complaints of chest pain or abdominal pain.  No fever.  Denies headache.  

Denies any focal weakness or numbness.  She doesn't have any increased 

confusion but patient will require subacute rehabilitation patient is alert 

oriented 1-2 patient does have confabulation appears to have advanced dementia 

will need placement patient is falling because of weakness from dementia all 

the imaging is so for negative.  Urine is bit abnormal but my suspicion is low 

for her UTI since I cannot completely rule out CORD and the continue with 

antibiotic.  Patient will need placement








Review of Systems


All other systems are negative patient denied any dysuria although not a 

reliable historian








Past Medical History


Past Medical History: Dementia, Diabetes Mellitus, Hypertension


Additional Past Medical History / Comment(s): uti-ecoli


History of Any Multi-Drug Resistant Organisms: ESBL


Date of last positivie culture/infection: 10/8/17


MDRO Source:: URINE ESBL


Past Surgical History: Cholecystectomy


Past Anesthesia/Blood Transfusion Reactions: No Reported Reaction


Past Psychological History: No Psychological Hx Reported


Additional Psychological History / Comment(s): pt lives with her 2 sons. she no 

longer drives they take to appts has glucometer stated her son checks bs


Smoking Status: Never smoker


Past Alcohol Use History: None Reported


Past Drug Use History: None Reported





- Past Family History


  ** Father


History Unknown: Yes





  ** Mother


History Unknown: Yes





Medications and Allergies


 Home Medications











 Medication  Instructions  Recorded  Confirmed  Type


 


Donepezil [Aricept] 10 mg PO HS 10/08/17 07/24/18 History


 


Losartan Potassium 75 mg PO DAILY 10/08/17 07/24/18 History


 


amLODIPine [Norvasc] 10 mg PO DAILY 10/08/17 07/24/18 History


 


metFORMIN HCL [Glucophage] 500 mg PO AC-BID 10/08/17 07/24/18 History


 


Atenolol [Tenormin] 50 mg PO BID 03/21/18 07/24/18 History


 


Magnesium Oxide [Mag-Ox] 400 mg PO BID 03/21/18 07/24/18 History


 


Memantine [Namenda] 5 mg PO BID 03/21/18 07/24/18 History


 


Pantoprazole [Protonix] 40 mg PO DAILY 03/21/18 07/24/18 History


 


glipiZIDE [Glucotrol] 10 mg PO AC-BID 03/21/18 07/24/18 History


 


sitaGLIPtin [Januvia] 100 mg PO DAILY 07/24/18 07/24/18 History











 Allergies











Allergy/AdvReac Type Severity Reaction Status Date / Time


 


No Known Allergies Allergy   Verified 07/24/18 18:33














Physical Exam


Vitals: 


 Vital Signs











  Temp Pulse Pulse Resp BP BP Pulse Ox


 


 07/25/18 05:23  98.2 F   60  16   174/75  94 L


 


 07/24/18 21:36  97.8 F   56 L  16   174/97  97


 


 07/24/18 20:54   54 L   18  148/73   97


 


 07/24/18 19:47  98.4 F  54 L   18  152/67   96


 


 07/24/18 17:02  97.7 F  57 L   18  147/76   97








 Intake and Output











 07/24/18 07/25/18 07/25/18





 22:59 06:59 14:59


 


Other:   


 


  Voiding Method  Diaper Diaper





   Incontinent


 


  # Voids  2 


 


  Weight 74.843 kg 74.843 kg 











PHYSICAL EXAMINATION: 





GENERAL: The patient is alert and oriented x1-2, not in any acute distress. 

Well developed, well nourished. 


HEENT: Pupils are round and equally reacting to light. EOMI. No scleral 

icterus. No conjunctival pallor. Normocephalic, atraumatic. No pharyngeal 

erythema. No thyromegaly. 


CARDIOVASCULAR: S1 and S2 present. No murmurs, rubs, or gallops. 


PULMONARY: Chest is clear to auscultation, no wheezing or crackles. 


ABDOMEN: Soft, nontender, nondistended, normoactive bowel sounds. No palpable 

organomegaly. 


MUSCULOSKELETAL: No joint swelling or deformity.


EXTREMITIES: No cyanosis, clubbing, or pedal edema. 


NEUROLOGICAL: Gross neurological examination did not reveal any focal deficits. 


SKIN: No rashes. 











Results


CBC & Chem 7: 


 07/24/18 18:35





 07/24/18 18:35


Labs: 


 Abnormal Lab Results - Last 24 Hours (Table)











  07/24/18 07/24/18 Range/Units





  18:35 19:43 


 


Potassium  5.2 H   (3.5-5.1)  mmol/L


 


BUN  28 H   (7-17)  mg/dL


 


Glucose  237 H   (74-99)  mg/dL


 


Total Protein  5.8 L   (6.3-8.2)  g/dL


 


Albumin  3.3 L   (3.5-5.0)  g/dL


 


Urine Protein   2+ H  (Negative)  


 


Urine Glucose (UA)   4+ H  (Negative)  


 


Ur Leukocyte Esterase   Large H  (Negative)  


 


Urine RBC   8 H  (0-5)  /hpf


 


Urine WBC   39 H  (0-5)  /hpf








 Microbiology - Last 24 Hours (Table)











 07/24/18 20:53 Urine Culture - Preliminary





 Urine,Catheterized 














Thrombosis Risk Factor Assmnt





- Choose All That Apply


Each Factor Represents 1 point: Obesity (BMI >25)


Other Risk Factors: Yes


Each Risk Factor Represents 3 Points: Age 75 years or older


Thrombosis Risk Factor Assessment Total Risk Factor Score: 4


Thrombosis Risk Factor Assessment Level: Moderate Risk





Assessment and Plan


Plan: 


Fall: Secondary to generalized weakness advanced dementia PT and OT was 

consulted patient will need subacute rehabilitation.


-Low possibly of urinary tract infection continue with Rocephin for now, 

patient will complete that therapy for 3 days after which it will be 

discontinued tomorrow


-Advanced dementia, either vascular dementia or Alzheimer's dementia.


-Hypertension


-Type 2 diabetes mellitus


 for above-mentioned chronic medical problems patient will be resumed and 

continued on appropriate home medications.

## 2018-07-26 LAB
GLUCOSE BLD-MCNC: 177 MG/DL (ref 75–99)
GLUCOSE BLD-MCNC: 189 MG/DL (ref 75–99)
GLUCOSE BLD-MCNC: 189 MG/DL (ref 75–99)
GLUCOSE BLD-MCNC: 271 MG/DL (ref 75–99)
HBA1C MFR BLD: 9.2 % (ref 4–6)

## 2018-07-26 RX ADMIN — INSULIN ASPART SCH UNIT: 100 INJECTION, SOLUTION INTRAVENOUS; SUBCUTANEOUS at 12:31

## 2018-07-26 RX ADMIN — MEMANTINE HYDROCHLORIDE SCH MG: 5 TABLET ORAL at 20:34

## 2018-07-26 RX ADMIN — INSULIN ASPART SCH UNIT: 100 INJECTION, SOLUTION INTRAVENOUS; SUBCUTANEOUS at 17:37

## 2018-07-26 RX ADMIN — MEMANTINE HYDROCHLORIDE SCH MG: 5 TABLET ORAL at 07:52

## 2018-07-26 RX ADMIN — ATENOLOL SCH MG: 50 TABLET ORAL at 07:52

## 2018-07-26 RX ADMIN — HEPARIN SODIUM SCH UNIT: 5000 INJECTION, SOLUTION INTRAVENOUS; SUBCUTANEOUS at 20:36

## 2018-07-26 RX ADMIN — Medication SCH MG: at 20:34

## 2018-07-26 RX ADMIN — Medication SCH MG: at 07:52

## 2018-07-26 RX ADMIN — INSULIN ASPART SCH UNIT: 100 INJECTION, SOLUTION INTRAVENOUS; SUBCUTANEOUS at 07:50

## 2018-07-26 RX ADMIN — INSULIN ASPART SCH UNIT: 100 INJECTION, SOLUTION INTRAVENOUS; SUBCUTANEOUS at 21:03

## 2018-07-26 NOTE — P.PN
Subjective


85-year-old female advanced dementia admitted after a fall.  Patient will need 

permanent placement in nursing home.  Patient is bit dehydrated pulling out IV 

lines will increase her to drink water by mouth.  Patient will be started on 

DVT prophylaxis with heparin subcutaneous.








Objective





- Vital Signs


Vital signs: 


 Vital Signs











Temp  97.9 F   07/26/18 05:00


 


Pulse  64   07/26/18 07:00


 


Resp  16   07/26/18 07:00


 


BP  168/74   07/26/18 05:00


 


Pulse Ox  96   07/26/18 05:00








 Intake & Output











 07/25/18 07/26/18 07/26/18





 18:59 06:59 18:59


 


Intake Total 1450 1180 


 


Balance 1450 1180 


 


Weight 74.843 kg  


 


Intake:   


 


  Intake, IV Titration 600  





  Amount   


 


    Sodium Chloride 0.9% 1, 600  





    000 ml @ 50 mls/hr IV .   





    Q20H [facility] Rx#:[id number]   


 


  Oral 850 1180 


 


Other:   


 


  Voiding Method Diaper Diaper Diaper





 Incontinent Incontinent Incontinent


 


  # Voids 1 2 1


 


  # Bowel Movements   1














- Exam





PHYSICAL EXAMINATION: 





GENERAL: The patient is alert and oriented x1-2, not in any acute distress. 

Well developed, well nourished. 


HEENT: Pupils are round and equally reacting to light. EOMI. No scleral 

icterus. No conjunctival pallor. Normocephalic, atraumatic. No pharyngeal 

erythema. No thyromegaly. 


CARDIOVASCULAR: S1 and S2 present. No murmurs, rubs, or gallops. 


PULMONARY: Chest is clear to auscultation, no wheezing or crackles. 


ABDOMEN: Soft, nontender, nondistended, normoactive bowel sounds. No palpable 

organomegaly. 


MUSCULOSKELETAL: No joint swelling or deformity.


EXTREMITIES: No cyanosis, clubbing, or pedal edema. 


NEUROLOGICAL: Gross neurological examination did not reveal any focal deficits. 


SKIN: No rashes. 








- Labs


CBC & Chem 7: 


 07/24/18 18:35





 07/24/18 18:35


Labs: 


 Abnormal Lab Results - Last 24 Hours (Table)











  07/25/18 07/25/18 07/26/18 Range/Units





  16:56 19:54 07:09 


 


POC Glucose (mg/dL)  290 H  246 H  189 H  (75-99)  mg/dL














  07/26/18 Range/Units





  10:58 


 


POC Glucose (mg/dL)  177 H  (75-99)  mg/dL








 Microbiology - Last 24 Hours (Table)











 07/24/18 20:59 Blood Culture - Preliminary





 Blood    No Growth after 24 hours


 


 07/24/18 20:53 Urine Culture - Final





 Urine,Catheterized 














Assessment and Plan


Plan: 


Fall: Secondary to generalized weakness advanced dementia, disposition to 

nursing home.


-Low possibly of urinary tract infection continue with Rocephin for now, she 

completed 3 day of therapy and the Rocephin will be discontinued


-Advanced dementia, either vascular dementia or Alzheimer's dementia.


-Hypertension


-Type 2 diabetes mellitus

## 2018-07-27 LAB
ANION GAP SERPL CALC-SCNC: 7 MMOL/L
BUN SERPL-SCNC: 19 MG/DL (ref 7–17)
CALCIUM SPEC-MCNC: 8.6 MG/DL (ref 8.4–10.2)
CHLORIDE SERPL-SCNC: 104 MMOL/L (ref 98–107)
CO2 SERPL-SCNC: 27 MMOL/L (ref 22–30)
GLUCOSE BLD-MCNC: 118 MG/DL (ref 75–99)
GLUCOSE BLD-MCNC: 211 MG/DL (ref 75–99)
GLUCOSE BLD-MCNC: 231 MG/DL (ref 75–99)
GLUCOSE BLD-MCNC: 85 MG/DL (ref 75–99)
GLUCOSE SERPL-MCNC: 89 MG/DL (ref 74–99)
POTASSIUM SERPL-SCNC: 4.3 MMOL/L (ref 3.5–5.1)
SODIUM SERPL-SCNC: 138 MMOL/L (ref 137–145)

## 2018-07-27 RX ADMIN — Medication SCH MG: at 09:09

## 2018-07-27 RX ADMIN — CEFUROXIME AXETIL SCH MG: 250 TABLET ORAL at 20:55

## 2018-07-27 RX ADMIN — CEFUROXIME AXETIL SCH MG: 250 TABLET ORAL at 11:32

## 2018-07-27 RX ADMIN — ATENOLOL SCH MG: 50 TABLET ORAL at 09:09

## 2018-07-27 RX ADMIN — MEMANTINE HYDROCHLORIDE SCH MG: 5 TABLET ORAL at 20:55

## 2018-07-27 RX ADMIN — INSULIN ASPART SCH UNIT: 100 INJECTION, SOLUTION INTRAVENOUS; SUBCUTANEOUS at 17:27

## 2018-07-27 RX ADMIN — HEPARIN SODIUM SCH UNIT: 5000 INJECTION, SOLUTION INTRAVENOUS; SUBCUTANEOUS at 20:55

## 2018-07-27 RX ADMIN — INSULIN ASPART SCH: 100 INJECTION, SOLUTION INTRAVENOUS; SUBCUTANEOUS at 09:05

## 2018-07-27 RX ADMIN — INSULIN ASPART SCH: 100 INJECTION, SOLUTION INTRAVENOUS; SUBCUTANEOUS at 11:44

## 2018-07-27 RX ADMIN — Medication SCH MG: at 20:55

## 2018-07-27 RX ADMIN — INSULIN ASPART SCH UNIT: 100 INJECTION, SOLUTION INTRAVENOUS; SUBCUTANEOUS at 21:00

## 2018-07-27 RX ADMIN — HEPARIN SODIUM SCH UNIT: 5000 INJECTION, SOLUTION INTRAVENOUS; SUBCUTANEOUS at 09:10

## 2018-07-27 RX ADMIN — MEMANTINE HYDROCHLORIDE SCH MG: 5 TABLET ORAL at 09:10

## 2018-07-27 NOTE — PN
PROGRESS NOTE



DATE OF SERVICE:

07/27/2018



This 84-year-old woman who was admitted with fall secondary to generalized weakness

also had dementia.  The patient being closely monitored. PT/OT evaluation.  Possible

ECF rehab.  No chest pain.  No palpitations.  No fever.



EXAM:

Alert and oriented x1.  Pulse 55, blood pressure 155/70, respirations 16, temperature

98.2, pulse ox 94% on room air.  HEENT is conjunctivae normal.  Oral mucosa moist.

Neck is no jugular venous distention.  No carotid bruit. No lymph node enlargement.

Cardiovascular:  S1, S2 muffled.  Respirations: Breath sounds diminished in the bases.

No rhonchi.  No crackles.

ABDOMEN:  Soft. Nontender.  Legs:  No edema. No swelling. Central nervous system: No

focal deficits.



LABS:

Glucose 118.  CBC within normal limits.  UA noted.



ASSESSMENT:

1. Fall secondary to generalized weakness and dementia.

2. Urinary tract infection.

3. Advanced dementia.

4. Hypertension.

5. Diabetes type 2.



RECOMMENDATIONS AND DISCUSSION:

Recommend to continue current medications, management and symptomatic treatment.

Otherwise, at this time, I recommend continue with Ceftin.  PT/OT evaluation.  Resume

the home medications and possible ECF rehab.  Guarded prognosis. Further

recommendations to follow.  Monitor the blood sugars closely.





MMODL / IJN: [id number] / Job#: [id number]

## 2018-07-28 VITALS — SYSTOLIC BLOOD PRESSURE: 178 MMHG | HEART RATE: 63 BPM | DIASTOLIC BLOOD PRESSURE: 78 MMHG

## 2018-07-28 LAB
GLUCOSE BLD-MCNC: 201 MG/DL (ref 75–99)
GLUCOSE BLD-MCNC: 91 MG/DL (ref 75–99)

## 2018-07-28 RX ADMIN — HEPARIN SODIUM SCH UNIT: 5000 INJECTION, SOLUTION INTRAVENOUS; SUBCUTANEOUS at 07:49

## 2018-07-28 RX ADMIN — INSULIN ASPART SCH: 100 INJECTION, SOLUTION INTRAVENOUS; SUBCUTANEOUS at 07:03

## 2018-07-28 RX ADMIN — Medication SCH MG: at 07:48

## 2018-07-28 RX ADMIN — INSULIN ASPART SCH UNIT: 100 INJECTION, SOLUTION INTRAVENOUS; SUBCUTANEOUS at 12:36

## 2018-07-28 RX ADMIN — CEFUROXIME AXETIL SCH MG: 250 TABLET ORAL at 07:48

## 2018-07-28 RX ADMIN — ATENOLOL SCH MG: 50 TABLET ORAL at 07:48

## 2018-07-28 RX ADMIN — MEMANTINE HYDROCHLORIDE SCH MG: 5 TABLET ORAL at 07:49

## 2018-07-28 NOTE — P.DS
Providers


Date of admission: 


07/25/18 12:49





Attending physician: 


Cipriano Burleson





Primary care physician: 


Cipriano Burleson





Hospital Course: 


Final diagnosis


Fall secondary to generalized weakness and dementia 


 UTI


Advanced dementia


Hypertension 


Severe gait dysfunction


diabetes mellitus type 2 


 full code





Hospital course 


this 84-year-old woman with a past medical history of multiple medical problems 

being followed by Dr. Burleson in the outpatient setting was admitted with the 

fall and generalized weakness and dementia.  Patient also had features of UTI.  

Patient was treated with antibiotics.  Patient improved significantly.  Patient 

be transferred to [facility] for rehab at this time.  Total time taken 

35 minutes.  Discuss with staff and case management.





On exam vitals are stable.  Cardio S1 and S2 normal.  No murmur nervous system 

system diffusely weak.





I would recommend the patient to follow-up with Dr. Sidhu and [facility] and also Dr. Burleson as an outpatient.  Please take a follow up appointment with Dr. Burleson 

after discharge from [facility]





Patient Condition at Discharge: Stable





Plan - Discharge Summary


Discharge Rx Participant: No


New Discharge Prescriptions: 


New


   Acetaminophen Tab [Tylenol] 650 mg PO Q6HR PRN  tab


     PRN Reason: Mild Pain Or Fever > 100.5


   Cefuroxime [Ceftin] 500 mg PO BID #8 tab


   INSULIN LISPRO (HumaLOG) [humaLOG] 0 unit SQ ACHS #1 vial





Continue


   metFORMIN HCL [Glucophage] 500 mg PO AC-BID


   amLODIPine [Norvasc] 10 mg PO DAILY


   Donepezil [Aricept] 10 mg PO HS


   Losartan Potassium 75 mg PO DAILY


   Magnesium Oxide [Mag-Ox] 400 mg PO BID


   glipiZIDE [Glucotrol] 10 mg PO AC-BID


   Pantoprazole [Protonix] 40 mg PO DAILY


   Memantine [Namenda] 5 mg PO BID


   Atenolol [Tenormin] 50 mg PO BID


   sitaGLIPtin [Januvia] 100 mg PO DAILY


Discharge Medication List





Donepezil [Aricept] 10 mg PO HS 10/08/17 [History]


Losartan Potassium 75 mg PO DAILY 10/08/17 [History]


amLODIPine [Norvasc] 10 mg PO DAILY 10/08/17 [History]


metFORMIN HCL [Glucophage] 500 mg PO AC-BID 10/08/17 [History]


Atenolol [Tenormin] 50 mg PO BID 03/21/18 [History]


Magnesium Oxide [Mag-Ox] 400 mg PO BID 03/21/18 [History]


Memantine [Namenda] 5 mg PO BID 03/21/18 [History]


Pantoprazole [Protonix] 40 mg PO DAILY 03/21/18 [History]


glipiZIDE [Glucotrol] 10 mg PO AC-BID 03/21/18 [History]


sitaGLIPtin [Januvia] 100 mg PO DAILY 07/24/18 [History]


Acetaminophen Tab [Tylenol] 650 mg PO Q6HR PRN  tab 07/28/18 [Rx]


Cefuroxime [Ceftin] 500 mg PO BID #8 tab 07/28/18 [Rx]


INSULIN LISPRO (HumaLOG) [humaLOG] 0 unit SQ ACHS #1 vial 07/28/18 [Rx]








Follow up Appointment(s)/Referral(s): 


Cipriano Burleson MD [Primary Care Provider] - 1-2 days


Kobe Sidhu MD [STAFF PHYSICIAN] - 1 Week


Activity/Diet/Wound Care/Special Instructions: 


Diet consistent carb


Activity as tolerated


Accu-Cheks before meals and at bedtime and insulin scale


Follow-up with Dr. Burleson after discharge from [facility]

## 2018-11-02 ENCOUNTER — HOSPITAL ENCOUNTER (EMERGENCY)
Dept: HOSPITAL 47 - EC | Age: 83
Discharge: HOME | End: 2018-11-02
Payer: MEDICARE

## 2018-11-02 VITALS — RESPIRATION RATE: 18 BRPM | SYSTOLIC BLOOD PRESSURE: 152 MMHG | DIASTOLIC BLOOD PRESSURE: 64 MMHG | HEART RATE: 82 BPM

## 2018-11-02 VITALS — TEMPERATURE: 98.8 F
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
## 2018-11-02 DIAGNOSIS — M25.552: Primary | ICD-10-CM
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
## 2018-11-02 DIAGNOSIS — E11.9: ICD-10-CM

## 2018-11-02 DIAGNOSIS — Z79.4: ICD-10-CM

## 2018-11-02 DIAGNOSIS — I10: ICD-10-CM

## 2018-11-02 DIAGNOSIS — R06.2: ICD-10-CM

## 2018-11-02 DIAGNOSIS — Z79.899: ICD-10-CM

## 2018-11-02 DIAGNOSIS — F03.90: ICD-10-CM

## 2018-11-02 PROCEDURE — 73552 X-RAY EXAM OF FEMUR 2/>: CPT

## 2018-11-02 PROCEDURE — 96374 THER/PROPH/DIAG INJ IV PUSH: CPT

## 2018-11-02 PROCEDURE — 99284 EMERGENCY DEPT VISIT MOD MDM: CPT

## 2018-11-02 PROCEDURE — 94640 AIRWAY INHALATION TREATMENT: CPT

## 2018-11-02 PROCEDURE — 71046 X-RAY EXAM CHEST 2 VIEWS: CPT

## 2018-11-02 PROCEDURE — 73502 X-RAY EXAM HIP UNI 2-3 VIEWS: CPT

## 2018-11-02 NOTE — XR
EXAMINATION TYPE: XR chest 2V

 

DATE OF EXAM: 11/2/2018

 

COMPARISON: 7/24/2018

 

HISTORY: Fall. Cough.

 

TECHNIQUE:  Frontal and lateral views of the chest are obtained.

 

FINDINGS:  There is no heart failure nor confluent pneumonic infiltrate. Costophrenic angles are gracie
r. Thoracic aorta is atheromatous. There is spurring in the thoracic spine.

 

IMPRESSION:  No active cardiopulmonary disease. Atheromatous aorta. Normal heart. No change.

## 2018-11-02 NOTE — XR
EXAMINATION TYPE: XR Hip LT and AP Pelvis

 

DATE OF EXAM: 11/2/2018

 

COMPARISON: 7/24/2018

 

HISTORY: Pain

 

TECHNIQUE: A single AP view of the pelvis is obtained. Two views of the left hip are obtained.  

 

FINDINGS:  There is severe narrowing of the left hip joint space. Pelvic ring is intact. Proximal lef
t femur is intact. There is spurring on the left femoral head. There is minor spurring at the right h
ip joint. Sacroiliac joints are intact.

 

IMPRESSION:

Severe osteoarthritis left hip joint. No significant change. No fracture seen.

## 2018-11-02 NOTE — ED
Fall HPI





- General


Stated Complaint: hip pain


Time Seen by Provider: 11/02/18 21:16


Source: patient, EMS


Limitations: physical limitation (Patient does appear to have some underlying 

dementia)





- History of Present Illness


Initial Comments: 





This patient is an 85-year-old woman transferred from nursing home to be 

evaluated after she had a fall at the nursing home.  I was reported that she 

was then not able to get up and was complaining of pain in the left leg.  When 

I interview the patient, she indicates the left hip area.  She is denying other 

complaints.  She denies head or neck pain, chest, back or abdomen pain.


MD Complaint: fall


-: hour(s)


Fall From: standing


Place Fall Occurred: nursing home/SNF


Loss of Consciousness: none


Prolonged Down Time?: no


Location - Extremities: Left: Leg


Severity: moderate


Context: tripped/slipped





- Related Data


 Home Medications











 Medication  Instructions  Recorded  Confirmed


 


Donepezil [Aricept] 10 mg PO HS 10/08/17 11/02/18


 


Losartan Potassium 50 mg PO DAILY 10/08/17 11/02/18


 


amLODIPine [Norvasc] 10 mg PO DAILY 10/08/17 11/02/18


 


metFORMIN HCL [Glucophage] 500 mg PO AC-BID 10/08/17 11/02/18


 


Magnesium Oxide [Mag-Ox] 400 mg PO BID 03/21/18 11/02/18


 


Memantine [Namenda] 5 mg PO BID 03/21/18 11/02/18


 


Pantoprazole [Protonix] 40 mg PO DAILY 03/21/18 11/02/18


 


Atenolol 100 mg PO DAILY 11/02/18 11/02/18


 


Insulin Aspart [NovoLOG See Protocol SQ ACHS 11/02/18 11/02/18





(formulary)]   


 


Liraglutide [Victoza 2-Yayo] 0.6 mg SQ DAILY 11/02/18 11/02/18


 


Losartan [Cozaar] 25 mg PO DAILY 11/02/18 11/02/18


 


guaiFENesin SYRUP 100MG/5ML 200 mg PO Q4H PRN 11/02/18 11/02/18





[Robitussin]   








 Previous Rx's











 Medication  Instructions  Recorded


 


Acetaminophen Tab [Tylenol] 650 mg PO Q6HR PRN  tab 07/28/18











 Allergies











Allergy/AdvReac Type Severity Reaction Status Date / Time


 


No Known Allergies Allergy   Verified 11/02/18 21:25














Review of Systems


ROS Statement: 


Those systems with pertinent positive or pertinent negative responses have been 

documented in the HPI.





ROS Other: All systems not noted in ROS Statement are negative.


Limitations: ROS unobtainable due to patients medical condition (Underlying 

dementia)


Respiratory: Denies: dyspnea


Cardiovascular: Denies: chest pain


Gastrointestinal: Denies: abdominal pain


Musculoskeletal: Reports: as per HPI, arthralgia (Left hip).  Denies: back pain


Neurological: Denies: headache





Past Medical History


Past Medical History: Dementia, Diabetes Mellitus, Hypertension


Additional Past Medical History / Comment(s): uti-ecoli


History of Any Multi-Drug Resistant Organisms: ESBL


Date of last positivie culture/infection: 10/8/17


MDRO Source:: URINE ESBL


Past Surgical History: Cholecystectomy


Past Anesthesia/Blood Transfusion Reactions: No Reported Reaction


Past Psychological History: No Psychological Hx Reported


Additional Psychological History / Comment(s): pt lives with her 2 sons. she no 

longer drives they take to appts has glucometer stated her son checks bs


Smoking Status: Never smoker


Past Alcohol Use History: None Reported


Past Drug Use History: None Reported





- Past Family History


  ** Father


History Unknown: Yes





  ** Mother


History Unknown: Yes





General Exam


General appearance: alert, in no apparent distress


Head exam: Present: atraumatic, normocephalic


Eye exam: Present: normal appearance.  Absent: scleral icterus, conjunctival 

injection


Neck exam: Present: normal inspection, full ROM.  Absent: tenderness


Respiratory exam: Present: wheezes.  Absent: respiratory distress, rales, 

rhonchi, stridor, chest wall tenderness, accessory muscle use, decreased breath 

sounds, prolonged expiratory


Cardiovascular Exam: Present: regular rate, normal rhythm, normal heart sounds.

  Absent: systolic murmur, diastolic murmur, rubs, gallop


GI/Abdominal exam: Present: soft.  Absent: distended, tenderness, guarding, 

rebound, rigid, mass


Extremities exam: Present: tenderness (Left hip), normal capillary refill, 

other (There does appear to be shortening of the left leg.  There is decreased 

range of motion at the left hip due to pain).  Absent: full ROM (Left hip)


Back exam: Absent: tenderness


Neurological exam: Present: alert.  Absent: oriented X3 (Patient is oriented 

only to person), motor sensory deficit


Skin exam: Present: warm, dry, intact, normal color.  Absent: rash





Course


 Vital Signs











  11/02/18 11/02/18 11/02/18





  21:09 22:02 22:10


 


Temperature 98.8 F  


 


Pulse Rate 79 99 78


 


Respiratory 18 18 20





Rate   


 


Blood Pressure 153/74  


 


O2 Sat by Pulse 96  





Oximetry   














  11/02/18 11/02/18





  22:30 23:15


 


Temperature  


 


Pulse Rate 84 82


 


Respiratory 16 18





Rate  


 


Blood Pressure 143/69 152/64


 


O2 Sat by Pulse 95 95





Oximetry  














Medical Decision Making





- Medical Decision Making





Patient is an 85-year-old woman in from the nursing home to be evaluated for 

possible hip injury after a fall.  The x-rays are negative.  The patient is 

able to support weight to transfer.  Patient's sons are here and state that she 

does appear to be at baseline.  Patient will be transferred back to nursing 

home.





Disposition


Clinical Impression: 


 Fall





Disposition: HOME SELF-CARE


Condition: Fair


Instructions:  Fall Prevention for Older Adults (ED)


Is patient prescribed a controlled substance at d/c from ED?: No


Referrals: 


Cipriano Burleson MD [Primary Care Provider] - 1-2 days

## 2018-11-02 NOTE — XR
EXAMINATION TYPE: XR femur LT

 

DATE OF EXAM: 11/2/2018

 

COMPARISON: NONE

 

HISTORY: Fall. Pain

 

TECHNIQUE: 4 views

 

FINDINGS: There is severe narrowing of the left hip joint space. There is moderate narrowing of the k
nee joint spaces with spurring of the femoral and tibial condyles. There is spurring on the acetabulu
m. There is spurring on the patella.

 

IMPRESSION: Osteoarthritis in the left hip joint and left knee. No fracture seen.

## 2020-05-12 ENCOUNTER — HOSPITAL ENCOUNTER (INPATIENT)
Dept: HOSPITAL 47 - EC | Age: 85
LOS: 3 days | Discharge: NURSING, REHAB OR LONG-TERM CARE | DRG: 689 | End: 2020-05-15
Attending: HOSPITALIST | Admitting: HOSPITALIST
Payer: MEDICARE

## 2020-05-12 DIAGNOSIS — B95.2: ICD-10-CM

## 2020-05-12 DIAGNOSIS — I10: ICD-10-CM

## 2020-05-12 DIAGNOSIS — Z79.4: ICD-10-CM

## 2020-05-12 DIAGNOSIS — Z86.19: ICD-10-CM

## 2020-05-12 DIAGNOSIS — E11.9: ICD-10-CM

## 2020-05-12 DIAGNOSIS — Z11.59: ICD-10-CM

## 2020-05-12 DIAGNOSIS — Z87.440: ICD-10-CM

## 2020-05-12 DIAGNOSIS — N17.9: ICD-10-CM

## 2020-05-12 DIAGNOSIS — Z90.49: ICD-10-CM

## 2020-05-12 DIAGNOSIS — F02.80: ICD-10-CM

## 2020-05-12 DIAGNOSIS — N30.00: Primary | ICD-10-CM

## 2020-05-12 DIAGNOSIS — G30.1: ICD-10-CM

## 2020-05-12 DIAGNOSIS — G93.41: ICD-10-CM

## 2020-05-12 DIAGNOSIS — Z79.899: ICD-10-CM

## 2020-05-12 LAB
ALBUMIN SERPL-MCNC: 3.7 G/DL (ref 3.5–5)
ALP SERPL-CCNC: 73 U/L (ref 38–126)
ALT SERPL-CCNC: 14 U/L (ref 4–34)
ANION GAP SERPL CALC-SCNC: 9 MMOL/L
APTT BLD: 20.7 SEC (ref 22–30)
AST SERPL-CCNC: 26 U/L (ref 14–36)
BASOPHILS # BLD AUTO: 0 K/UL (ref 0–0.2)
BASOPHILS NFR BLD AUTO: 0 %
BUN SERPL-SCNC: 34 MG/DL (ref 7–17)
CALCIUM SPEC-MCNC: 9.2 MG/DL (ref 8.4–10.2)
CHLORIDE SERPL-SCNC: 103 MMOL/L (ref 98–107)
CO2 SERPL-SCNC: 29 MMOL/L (ref 22–30)
EOSINOPHIL # BLD AUTO: 0.4 K/UL (ref 0–0.7)
EOSINOPHIL NFR BLD AUTO: 3 %
ERYTHROCYTE [DISTWIDTH] IN BLOOD BY AUTOMATED COUNT: 4.37 M/UL (ref 3.8–5.4)
ERYTHROCYTE [DISTWIDTH] IN BLOOD: 14.8 % (ref 11.5–15.5)
GLUCOSE BLD-MCNC: 105 MG/DL (ref 75–99)
GLUCOSE BLD-MCNC: 116 MG/DL (ref 75–99)
GLUCOSE BLD-MCNC: 72 MG/DL (ref 75–99)
GLUCOSE SERPL-MCNC: 122 MG/DL (ref 74–99)
HCT VFR BLD AUTO: 37.5 % (ref 34–46)
HGB BLD-MCNC: 11.8 GM/DL (ref 11.4–16)
INR PPP: 0.9 (ref ?–1.2)
LYMPHOCYTES # SPEC AUTO: 2 K/UL (ref 1–4.8)
LYMPHOCYTES NFR SPEC AUTO: 16 %
MCH RBC QN AUTO: 27 PG (ref 25–35)
MCHC RBC AUTO-ENTMCNC: 31.5 G/DL (ref 31–37)
MCV RBC AUTO: 85.7 FL (ref 80–100)
MONOCYTES # BLD AUTO: 0.6 K/UL (ref 0–1)
MONOCYTES NFR BLD AUTO: 5 %
NEUTROPHILS # BLD AUTO: 9.5 K/UL (ref 1.3–7.7)
NEUTROPHILS NFR BLD AUTO: 75 %
PH UR: 5 [PH] (ref 5–8)
PLATELET # BLD AUTO: 410 K/UL (ref 150–450)
POTASSIUM SERPL-SCNC: 4.5 MMOL/L (ref 3.5–5.1)
PROT SERPL-MCNC: 6.6 G/DL (ref 6.3–8.2)
PROT UR QL: (no result)
PT BLD: 9.9 SEC (ref 9–12)
RBC UR QL: 13 /HPF (ref 0–5)
SODIUM SERPL-SCNC: 141 MMOL/L (ref 137–145)
SP GR UR: 1.01 (ref 1–1.03)
SQUAMOUS UR QL AUTO: 1 /HPF (ref 0–4)
UROBILINOGEN UR QL STRIP: <2 MG/DL (ref ?–2)
WBC # BLD AUTO: 12.7 K/UL (ref 3.8–10.6)
WBC # UR AUTO: >182 /HPF (ref 0–5)

## 2020-05-12 PROCEDURE — 87077 CULTURE AEROBIC IDENTIFY: CPT

## 2020-05-12 PROCEDURE — 87040 BLOOD CULTURE FOR BACTERIA: CPT

## 2020-05-12 PROCEDURE — 36415 COLL VENOUS BLD VENIPUNCTURE: CPT

## 2020-05-12 PROCEDURE — 81001 URINALYSIS AUTO W/SCOPE: CPT

## 2020-05-12 PROCEDURE — 85730 THROMBOPLASTIN TIME PARTIAL: CPT

## 2020-05-12 PROCEDURE — 71045 X-RAY EXAM CHEST 1 VIEW: CPT

## 2020-05-12 PROCEDURE — 87186 SC STD MICRODIL/AGAR DIL: CPT

## 2020-05-12 PROCEDURE — 99285 EMERGENCY DEPT VISIT HI MDM: CPT

## 2020-05-12 PROCEDURE — 83605 ASSAY OF LACTIC ACID: CPT

## 2020-05-12 PROCEDURE — 87086 URINE CULTURE/COLONY COUNT: CPT

## 2020-05-12 PROCEDURE — 87635 SARS-COV-2 COVID-19 AMP PRB: CPT

## 2020-05-12 PROCEDURE — 80048 BASIC METABOLIC PNL TOTAL CA: CPT

## 2020-05-12 PROCEDURE — 80053 COMPREHEN METABOLIC PANEL: CPT

## 2020-05-12 PROCEDURE — 85025 COMPLETE CBC W/AUTO DIFF WBC: CPT

## 2020-05-12 PROCEDURE — 93005 ELECTROCARDIOGRAM TRACING: CPT

## 2020-05-12 PROCEDURE — 85610 PROTHROMBIN TIME: CPT

## 2020-05-12 RX ADMIN — HEPARIN SODIUM SCH UNIT: 5000 INJECTION, SOLUTION INTRAVENOUS; SUBCUTANEOUS at 21:19

## 2020-05-12 RX ADMIN — MEMANTINE HYDROCHLORIDE SCH MG: 5 TABLET ORAL at 08:05

## 2020-05-12 RX ADMIN — Medication SCH MG: at 08:06

## 2020-05-12 RX ADMIN — Medication SCH MG: at 21:19

## 2020-05-12 RX ADMIN — CEFAZOLIN SCH MLS/HR: 330 INJECTION, POWDER, FOR SOLUTION INTRAMUSCULAR; INTRAVENOUS at 21:18

## 2020-05-12 RX ADMIN — ATENOLOL SCH MG: 50 TABLET ORAL at 08:05

## 2020-05-12 RX ADMIN — ASPIRIN SCH: 325 TABLET ORAL at 16:40

## 2020-05-12 RX ADMIN — MEMANTINE HYDROCHLORIDE SCH MG: 5 TABLET ORAL at 21:19

## 2020-05-12 RX ADMIN — DONEPEZIL HYDROCHLORIDE SCH MG: 10 TABLET ORAL at 21:19

## 2020-05-12 RX ADMIN — CEFAZOLIN SCH MLS/HR: 330 INJECTION, POWDER, FOR SOLUTION INTRAMUSCULAR; INTRAVENOUS at 04:31

## 2020-05-12 RX ADMIN — PANTOPRAZOLE SODIUM SCH MG: 40 TABLET, DELAYED RELEASE ORAL at 08:05

## 2020-05-12 RX ADMIN — PIPERACILLIN AND TAZOBACTAM SCH MLS/HR: 3; .375 INJECTION, POWDER, FOR SOLUTION INTRAVENOUS at 21:19

## 2020-05-12 RX ADMIN — LOSARTAN POTASSIUM SCH MG: 50 TABLET, FILM COATED ORAL at 08:05

## 2020-05-12 NOTE — P.HPIM
History of Present Illness





this is a pleasant 86 years old female with past medical history of dementia, 

diabetes mellitus, hypertension.  Presents with altered mental status.  Patient 

could not provide information that it was obtained from the chart and staff.  It

looks like patient admitted for become less responsive and in view of her 

history of dementia


patient is afebrile and vitals are stable.  Showed leukocytosis of 12.7 

K,creatinine 1.26, baseline creatinine is 0.8-1.2.


EKG showed normal sinus rhythm at 65 BPM, , no ST-T changes.  Chest x-ray

no acute process by radiologist.


in the emergency room patient was started on Zosyn and Levaquin











Review of Systems





N/a, patient could not provide information





Past Medical History


Past Medical History: Dementia, Diabetes Mellitus, Hypertension


Additional Past Medical History / Comment(s): uti-ecoli


History of Any Multi-Drug Resistant Organisms: ESBL


Date of last positivie culture/infection: 10/8/17


MDRO Source:: URINE ESBL


Past Surgical History: Cholecystectomy


Past Anesthesia/Blood Transfusion Reactions: No Reported Reaction


Past Psychological History: No Psychological Hx Reported


Smoking Status: Never smoker


Past Alcohol Use History: None Reported


Past Drug Use History: None Reported





- Past Family History


  ** Father


History Unknown: Yes





  ** Mother


History Unknown: Yes





Medications and Allergies


                                Home Medications











 Medication  Instructions  Recorded  Confirmed  Type


 


Donepezil [Aricept] 10 mg PO HS@2100 10/08/17 05/12/20 History


 


Losartan Potassium 50 mg PO DAILY@0900 10/08/17 05/12/20 History


 


amLODIPine [Norvasc] 10 mg PO DAILY@0900 10/08/17 05/12/20 History


 


Magnesium Oxide [Mag-Ox] 400 mg PO BID@0900,2100 03/21/18 05/12/20 History


 


Memantine [Namenda] 5 mg PO BID@0900,2100 03/21/18 05/12/20 History


 


Pantoprazole [Protonix] 40 mg PO HS@2100 03/21/18 05/12/20 History


 


Losartan [Cozaar] 25 mg PO DAILY@0900 11/02/18 05/12/20 History


 


guaiFENesin SYRUP 100MG/5ML 200 mg PO Q4H PRN 11/02/18 05/12/20 History





[Robitussin]    


 


Acetaminophen [Tylenol 8 Hour] 650 mg PO Q8H PRN 05/12/20 05/12/20 History


 


Cholecalciferol [Vitamin D3 (25 2,000 unit PO HS@2100 05/12/20 05/12/20 History





Mcg = 1000 Iu)]    


 


Dicyclomine [Bentyl] 20 mg PO QID 05/12/20 05/12/20 History


 


Ertapenem [INVanz] 1 gram IV HS@2100 05/12/20 05/12/20 History


 


Insulin Aspart [NovoLOG] 4 units SQ BID@1200,1700 05/12/20 05/12/20 History


 


Insulin Degludec [Tresiba 30 unit SQ HS@2100 05/12/20 05/12/20 History





Flextouch U-200]    


 


Liraglutide [Victoza 3-Yayo] 1.2 mg SQ HS@2100 05/12/20 05/12/20 History


 


Metoprolol Tartrate [Lopressor] 50 mg PO BID@0900,2100 05/12/20 05/12/20 History


 


metFORMIN HCL ER [Glucophage Xr] 1,000 mg PO DAILY@0900 05/12/20 05/12/20 

History








                                    Allergies











Allergy/AdvReac Type Severity Reaction Status Date / Time


 


No Known Allergies Allergy   Verified 05/12/20 10:23














Physical Exam


Vitals: 


                                   Vital Signs











  Temp Pulse Pulse Resp BP BP Pulse Ox


 


 05/12/20 08:00    69  14   


 


 05/12/20 07:00  95.4 F L   69  14   120/76  95


 


 05/12/20 05:10  97.8 F   69    128/54  96


 


 05/12/20 04:30   65   18  134/66   96


 


 05/12/20 04:00   63   16  145/77   97


 


 05/12/20 03:30   62   16  157/111   97


 


 05/12/20 03:00   64   16  138/109   97


 


 05/12/20 02:35   63   16  160/122   97


 


 05/12/20 02:30  98.3 F  64   20  138/109   98








                                Intake and Output











 05/11/20 05/12/20 05/12/20





 22:59 06:59 14:59


 


Other:   


 


  # Voids  1 


 


  # Bowel Movements  1 


 


  Weight  54.431 kg 




















-GENERAL: The patient is awake and she has good attention span however she does 

not answer questions and she does not obeys commands, not in any acute distress.

Well developed, well nourished. 


HEENT: Pupils are round and equally reacting to light. EOMI. No scleral icterus.

No conjunctival pallor. Normocephalic, atraumatic. No pharyngeal erythema. No 

thyromegaly. 


CARDIOVASCULAR: S1 and S2 present. No murmurs, rubs, or gallops. 


PULMONARY: Chest is clear to auscultation, no wheezing or crackles. 


-ABDOMEN: Soft, mild suprapubic discomfort, nondistended, normoactive bowel 

sounds. No palpable organomegaly. 


MUSCULOSKELETAL: No joint swelling or deformity. 


EXTREMITIES: No cyanosis, clubbing, or pedal edema. 


-NEUROLOGICAL: Gross neurological examination did not reveal any focal deficits.

meningeal signs are absent


SKIN: No rashes. No petechiae











Results


CBC & Chem 7: 


                                 05/12/20 02:46





                                 05/12/20 02:46


Labs: 


                  Abnormal Lab Results - Last 24 Hours (Table)











  05/12/20 05/12/20 05/12/20 Range/Units





  02:46 02:46 02:46 


 


WBC  12.7 H    (3.8-10.6)  k/uL


 


Neutrophils #  9.5 H    (1.3-7.7)  k/uL


 


APTT   20.7 L   (22.0-30.0)  sec


 


BUN    34 H  (7-17)  mg/dL


 


Creatinine    1.26 H  (0.52-1.04)  mg/dL


 


Glucose    122 H  (74-99)  mg/dL


 


POC Glucose (mg/dL)     (75-99)  mg/dL


 


Urine Appearance     (Clear)  


 


Urine Protein     (Negative)  


 


Urine Blood     (Negative)  


 


Ur Leukocyte Esterase     (Negative)  


 


Urine RBC     (0-5)  /hpf


 


Urine WBC     (0-5)  /hpf


 


Urine WBC Clumps     (None)  /hpf


 


Urine Mucus     (None)  /hpf














  05/12/20 05/12/20 Range/Units





  03:01 09:09 


 


WBC    (3.8-10.6)  k/uL


 


Neutrophils #    (1.3-7.7)  k/uL


 


APTT    (22.0-30.0)  sec


 


BUN    (7-17)  mg/dL


 


Creatinine    (0.52-1.04)  mg/dL


 


Glucose    (74-99)  mg/dL


 


POC Glucose (mg/dL)   105 H  (75-99)  mg/dL


 


Urine Appearance  Turbid H   (Clear)  


 


Urine Protein  1+ H   (Negative)  


 


Urine Blood  Trace H   (Negative)  


 


Ur Leukocyte Esterase  Large H   (Negative)  


 


Urine RBC  13 H   (0-5)  /hpf


 


Urine WBC  >182 H   (0-5)  /hpf


 


Urine WBC Clumps  Many H   (None)  /hpf


 


Urine Mucus  Rare H   (None)  /hpf














Assessment and Plan


Assessment: 





Acute urinary tract infection


Metabolic encephalopathy secondary to above


acute kidney injury


Diabetes mellitus


Hypertension
































Plan: 





this is a pleasant 86 years old female who presents with UTI and encephalopathy.

 Continue with Levaquin.  stop Zosyn.follow-up urine culture.  Continue with 

gentle hydration and normal saline at 50 mL per hour.  Follow-up SARS to test 

for covid-19 as it is as it is pandemic time


Labs and medication were reviewed..  Continue same treatment.  Continue with 

symptomatic treatment.  Resume home medication.  Monitor lytes and vitals.  DVT 

and GI prophylaxis.  Further recommendations of the clinical course of the 

patient


DVT prophylaxis: Subcutaneous heparin


GI Prophylaxis: Pepcid


PT/OT: Pending


Prognosis is guarded

## 2020-05-12 NOTE — XR
EXAMINATION TYPE: XR chest 1V portable

 

DATE OF EXAM: 5/12/2020

 

COMPARISON: 11/2/2018

 

HISTORY: Fever

 

TECHNIQUE:

 

FINDINGS: Heart size is normal. There is no heart failure. Thoracic aorta is atheromatous. There are 
chest leads. There is old right-sided healed rib fractures. Costophrenic angles are clear. There is n
o pulmonary consolidation.

 

IMPRESSION: No active cardiopulmonary disease. Atheromatous aorta. Heart and lungs not significantly 
different than old exam.

## 2020-05-12 NOTE — ED
Altered Mental Status HPI





- General


Chief Complaint: Altered Mental Status


Stated Complaint: Recheck/Altered


Time Seen by Provider: 05/12/20 02:31


Source: RN/MD, EMS


Mode of arrival: EMS


Limitations: altered mental status





- History of Present Illness


Initial Comments: 





This patient is a 6-year-old woman presenting from nursing home for evaluation 

of altered mental status.  They report that the patient has been less responsive

than his usual.  There is history of underlying dementia, but patient reported 

clearly not at baseline.  Patient did have urine sent that was suspicious for 

urinary tract infection.  Patient is not able to give any history.  Patient does

deny pain


MD Complaint: altered mental status


-: unknown


Consistency of Symptoms: getting worse





- Related Data


                                Home Medications











 Medication  Instructions  Recorded  Confirmed


 


Donepezil [Aricept] 10 mg PO HS 10/08/17 11/02/18


 


Losartan Potassium 50 mg PO DAILY 10/08/17 11/02/18


 


amLODIPine [Norvasc] 10 mg PO DAILY 10/08/17 11/02/18


 


metFORMIN HCL [Glucophage] 500 mg PO AC-BID 10/08/17 11/02/18


 


Magnesium Oxide [Mag-Ox] 400 mg PO BID 03/21/18 11/02/18


 


Memantine [Namenda] 5 mg PO BID 03/21/18 11/02/18


 


Pantoprazole [Protonix] 40 mg PO DAILY 03/21/18 11/02/18


 


Atenolol 100 mg PO DAILY 11/02/18 11/02/18


 


INSULIN ASPART (NovoLOG) [NovoLOG See Protocol SQ ACHS 11/02/18 11/02/18





(formulary)]   


 


Liraglutide [Victoza 2-Yayo] 0.6 mg SQ DAILY 11/02/18 11/02/18


 


Losartan [Cozaar] 25 mg PO DAILY 11/02/18 11/02/18


 


guaiFENesin SYRUP 100MG/5ML 200 mg PO Q4H PRN 11/02/18 11/02/18





[Robitussin]   








                                  Previous Rx's











 Medication  Instructions  Recorded


 


Acetaminophen Tab [Tylenol] 650 mg PO Q6HR PRN  tab 07/28/18











                                    Allergies











Allergy/AdvReac Type Severity Reaction Status Date / Time


 


No Known Allergies Allergy   Verified 11/02/18 21:25














Review of Systems


ROS Statement: 


Those systems with pertinent positive or pertinent negative responses have been 

documented in the HPI.





ROS Other: All systems not noted in ROS Statement are negative.


Limitations: ROS unobtainable due to patients medical condition (Delirium versus

 dementia)





Past Medical History


Past Medical History: Dementia, Diabetes Mellitus, Hypertension


Additional Past Medical History / Comment(s): uti-ecoli


History of Any Multi-Drug Resistant Organisms: ESBL


Date of last positivie culture/infection: 10/8/17


MDRO Source:: URINE ESBL


Past Surgical History: Cholecystectomy


Past Anesthesia/Blood Transfusion Reactions: No Reported Reaction


Past Psychological History: No Psychological Hx Reported


Smoking Status: Never smoker


Past Alcohol Use History: None Reported


Past Drug Use History: None Reported





- Past Family History


  ** Father


History Unknown: Yes





  ** Mother


History Unknown: Yes





General Exam


Limitations: altered mental status


General appearance: alert, in no apparent distress


Head exam: Present: atraumatic, normocephalic


Eye exam: Present: normal appearance


ENT exam: Present: mucous membranes dry


Neck exam: Present: normal inspection, full ROM.  Absent: tenderness, 

meningismus


Respiratory exam: Present: normal lung sounds bilaterally.  Absent: respiratory 

distress, wheezes, rales, rhonchi, stridor, chest wall tenderness


Cardiovascular Exam: Present: regular rate, normal rhythm, normal heart sounds. 

 Absent: systolic murmur, diastolic murmur, rubs, gallop


GI/Abdominal exam: Present: soft.  Absent: tenderness, guarding, rebound, mass


Extremities exam: Present: normal inspection, normal capillary refill.  Absent: 

pedal edema, calf tenderness


Neurological exam: Present: alert, CN II-XII intact.  Absent: oriented X3 

(Patient is oriented to person), motor sensory deficit


Skin exam: Present: warm, dry, intact, normal color.  Absent: rash





Course


                                   Vital Signs











  05/12/20 05/12/20 05/12/20





  02:30 02:35 03:00


 


Temperature 98.3 F  


 


Pulse Rate 64 63 64


 


Pulse Rate [   





Pulse Oximetery   





]   


 


Respiratory 20 16 16





Rate   


 


Blood Pressure 138/109 160/122 138/109


 


Blood Pressure   





[Left Arm]   


 


O2 Sat by Pulse 98 97 97





Oximetry   














  05/12/20 05/12/20 05/12/20





  03:30 04:00 04:30


 


Temperature   


 


Pulse Rate 62 63 65


 


Pulse Rate [   





Pulse Oximetery   





]   


 


Respiratory 16 16 18





Rate   


 


Blood Pressure 157/111 145/77 134/66


 


Blood Pressure   





[Left Arm]   


 


O2 Sat by Pulse 97 97 96





Oximetry   














  05/12/20





  05:10


 


Temperature 97.8 F


 


Pulse Rate 


 


Pulse Rate [ 69





Pulse Oximetery 





] 


 


Respiratory 





Rate 


 


Blood Pressure 


 


Blood Pressure 128/54





[Left Arm] 


 


O2 Sat by Pulse 96





Oximetry 














Medical Decision Making





- Medical Decision Making





Patient is an 86-year-old woman resident of nursing home sent for worsening of 

her mental status.  Found to have urinary tract infection.  Patient started on 

antibiotics and will be admitted for results of cultures.





- Lab Data


Result diagrams: 


                                 05/12/20 02:46





                                 05/12/20 02:46


                                   Lab Results











  05/12/20 05/12/20 05/12/20 Range/Units





  02:46 02:46 02:46 


 


WBC  12.7 H    (3.8-10.6)  k/uL


 


RBC  4.37    (3.80-5.40)  m/uL


 


Hgb  11.8    (11.4-16.0)  gm/dL


 


Hct  37.5    (34.0-46.0)  %


 


MCV  85.7    (80.0-100.0)  fL


 


MCH  27.0    (25.0-35.0)  pg


 


MCHC  31.5    (31.0-37.0)  g/dL


 


RDW  14.8    (11.5-15.5)  %


 


Plt Count  410    (150-450)  k/uL


 


Neutrophils %  75    %


 


Lymphocytes %  16    %


 


Monocytes %  5    %


 


Eosinophils %  3    %


 


Basophils %  0    %


 


Neutrophils #  9.5 H    (1.3-7.7)  k/uL


 


Lymphocytes #  2.0    (1.0-4.8)  k/uL


 


Monocytes #  0.6    (0-1.0)  k/uL


 


Eosinophils #  0.4    (0-0.7)  k/uL


 


Basophils #  0.0    (0-0.2)  k/uL


 


PT   9.9   (9.0-12.0)  sec


 


INR   0.9   (<1.2)  


 


APTT   20.7 L   (22.0-30.0)  sec


 


Sodium    141  (137-145)  mmol/L


 


Potassium    4.5  (3.5-5.1)  mmol/L


 


Chloride    103  ()  mmol/L


 


Carbon Dioxide    29  (22-30)  mmol/L


 


Anion Gap    9  mmol/L


 


BUN    34 H  (7-17)  mg/dL


 


Creatinine    1.26 H  (0.52-1.04)  mg/dL


 


Est GFR (CKD-EPI)AfAm    45  (>60 ml/min/1.73 sqM)  


 


Est GFR (CKD-EPI)NonAf    39  (>60 ml/min/1.73 sqM)  


 


Glucose    122 H  (74-99)  mg/dL


 


Plasma Lactic Acid Victor Manuel     (0.7-2.0)  mmol/L


 


Calcium    9.2  (8.4-10.2)  mg/dL


 


Total Bilirubin    0.3  (0.2-1.3)  mg/dL


 


AST    26  (14-36)  U/L


 


ALT    14  (4-34)  U/L


 


Alkaline Phosphatase    73  ()  U/L


 


Total Protein    6.6  (6.3-8.2)  g/dL


 


Albumin    3.7  (3.5-5.0)  g/dL


 


Urine Color     


 


Urine Appearance     (Clear)  


 


Urine pH     (5.0-8.0)  


 


Ur Specific Gravity     (1.001-1.035)  


 


Urine Protein     (Negative)  


 


Urine Glucose (UA)     (Negative)  


 


Urine Ketones     (Negative)  


 


Urine Blood     (Negative)  


 


Urine Nitrite     (Negative)  


 


Urine Bilirubin     (Negative)  


 


Urine Urobilinogen     (<2.0)  mg/dL


 


Ur Leukocyte Esterase     (Negative)  


 


Urine RBC     (0-5)  /hpf


 


Urine WBC     (0-5)  /hpf


 


Urine WBC Clumps     (None)  /hpf


 


Ur Squamous Epith Cells     (0-4)  /hpf


 


Urine Mucus     (None)  /hpf














  05/12/20 05/12/20 Range/Units





  02:46 03:01 


 


WBC    (3.8-10.6)  k/uL


 


RBC    (3.80-5.40)  m/uL


 


Hgb    (11.4-16.0)  gm/dL


 


Hct    (34.0-46.0)  %


 


MCV    (80.0-100.0)  fL


 


MCH    (25.0-35.0)  pg


 


MCHC    (31.0-37.0)  g/dL


 


RDW    (11.5-15.5)  %


 


Plt Count    (150-450)  k/uL


 


Neutrophils %    %


 


Lymphocytes %    %


 


Monocytes %    %


 


Eosinophils %    %


 


Basophils %    %


 


Neutrophils #    (1.3-7.7)  k/uL


 


Lymphocytes #    (1.0-4.8)  k/uL


 


Monocytes #    (0-1.0)  k/uL


 


Eosinophils #    (0-0.7)  k/uL


 


Basophils #    (0-0.2)  k/uL


 


PT    (9.0-12.0)  sec


 


INR    (<1.2)  


 


APTT    (22.0-30.0)  sec


 


Sodium    (137-145)  mmol/L


 


Potassium    (3.5-5.1)  mmol/L


 


Chloride    ()  mmol/L


 


Carbon Dioxide    (22-30)  mmol/L


 


Anion Gap    mmol/L


 


BUN    (7-17)  mg/dL


 


Creatinine    (0.52-1.04)  mg/dL


 


Est GFR (CKD-EPI)AfAm    (>60 ml/min/1.73 sqM)  


 


Est GFR (CKD-EPI)NonAf    (>60 ml/min/1.73 sqM)  


 


Glucose    (74-99)  mg/dL


 


Plasma Lactic Acid Victor Manuel  1.7   (0.7-2.0)  mmol/L


 


Calcium    (8.4-10.2)  mg/dL


 


Total Bilirubin    (0.2-1.3)  mg/dL


 


AST    (14-36)  U/L


 


ALT    (4-34)  U/L


 


Alkaline Phosphatase    ()  U/L


 


Total Protein    (6.3-8.2)  g/dL


 


Albumin    (3.5-5.0)  g/dL


 


Urine Color   Yellow  


 


Urine Appearance   Turbid H  (Clear)  


 


Urine pH   5.0  (5.0-8.0)  


 


Ur Specific Gravity   1.015  (1.001-1.035)  


 


Urine Protein   1+ H  (Negative)  


 


Urine Glucose (UA)   Negative  (Negative)  


 


Urine Ketones   Negative  (Negative)  


 


Urine Blood   Trace H  (Negative)  


 


Urine Nitrite   Negative  (Negative)  


 


Urine Bilirubin   Negative  (Negative)  


 


Urine Urobilinogen   <2.0  (<2.0)  mg/dL


 


Ur Leukocyte Esterase   Large H  (Negative)  


 


Urine RBC   13 H  (0-5)  /hpf


 


Urine WBC   >182 H  (0-5)  /hpf


 


Urine WBC Clumps   Many H  (None)  /hpf


 


Ur Squamous Epith Cells   1  (0-4)  /hpf


 


Urine Mucus   Rare H  (None)  /hpf














- EKG Data


-: EKG Interpreted by Me


EKG shows normal: sinus rhythm, axis (Normal), intervals (Normal), QRS complexes

(Normal)


Rate: normal (Rate 65 bpm)


Interpretation: nonspecific ST-T wave changes





Disposition


Clinical Impression: 


 UTI (urinary tract infection), Altered mental status





Disposition: ADMITTED AS IP TO THIS [facility]


Condition: Poor


Instructions (If sedation given, give patient instructions):  Altered Mental 

Status (ED)


Referrals: 


Kobe Sidhu MD [Primary Care Provider] - 1-2 days

## 2020-05-13 LAB
ANION GAP SERPL CALC-SCNC: 11 MMOL/L
BASOPHILS # BLD AUTO: 0 K/UL (ref 0–0.2)
BASOPHILS NFR BLD AUTO: 0 %
BUN SERPL-SCNC: 23 MG/DL (ref 7–17)
CALCIUM SPEC-MCNC: 9.1 MG/DL (ref 8.4–10.2)
CHLORIDE SERPL-SCNC: 105 MMOL/L (ref 98–107)
CO2 SERPL-SCNC: 25 MMOL/L (ref 22–30)
EOSINOPHIL # BLD AUTO: 0.2 K/UL (ref 0–0.7)
EOSINOPHIL NFR BLD AUTO: 2 %
ERYTHROCYTE [DISTWIDTH] IN BLOOD BY AUTOMATED COUNT: 4.39 M/UL (ref 3.8–5.4)
ERYTHROCYTE [DISTWIDTH] IN BLOOD: 14.7 % (ref 11.5–15.5)
GLUCOSE BLD-MCNC: 100 MG/DL (ref 75–99)
GLUCOSE BLD-MCNC: 73 MG/DL (ref 75–99)
GLUCOSE BLD-MCNC: 76 MG/DL (ref 75–99)
GLUCOSE BLD-MCNC: 79 MG/DL (ref 75–99)
GLUCOSE SERPL-MCNC: 69 MG/DL (ref 74–99)
HCT VFR BLD AUTO: 37.6 % (ref 34–46)
HGB BLD-MCNC: 12 GM/DL (ref 11.4–16)
LYMPHOCYTES # SPEC AUTO: 1.6 K/UL (ref 1–4.8)
LYMPHOCYTES NFR SPEC AUTO: 13 %
MCH RBC QN AUTO: 27.4 PG (ref 25–35)
MCHC RBC AUTO-ENTMCNC: 32 G/DL (ref 31–37)
MCV RBC AUTO: 85.6 FL (ref 80–100)
MONOCYTES # BLD AUTO: 0.9 K/UL (ref 0–1)
MONOCYTES NFR BLD AUTO: 7 %
NEUTROPHILS # BLD AUTO: 9.6 K/UL (ref 1.3–7.7)
NEUTROPHILS NFR BLD AUTO: 78 %
PLATELET # BLD AUTO: 480 K/UL (ref 150–450)
POTASSIUM SERPL-SCNC: 4.1 MMOL/L (ref 3.5–5.1)
SODIUM SERPL-SCNC: 141 MMOL/L (ref 137–145)
WBC # BLD AUTO: 12.4 K/UL (ref 3.8–10.6)

## 2020-05-13 RX ADMIN — PIPERACILLIN AND TAZOBACTAM SCH MLS/HR: 3; .375 INJECTION, POWDER, FOR SOLUTION INTRAVENOUS at 04:24

## 2020-05-13 RX ADMIN — MEMANTINE HYDROCHLORIDE SCH MG: 5 TABLET ORAL at 20:19

## 2020-05-13 RX ADMIN — ATENOLOL SCH MG: 50 TABLET ORAL at 08:22

## 2020-05-13 RX ADMIN — HEPARIN SODIUM SCH UNIT: 5000 INJECTION, SOLUTION INTRAVENOUS; SUBCUTANEOUS at 08:24

## 2020-05-13 RX ADMIN — DONEPEZIL HYDROCHLORIDE SCH MG: 10 TABLET ORAL at 20:19

## 2020-05-13 RX ADMIN — ASPIRIN SCH: 325 TABLET ORAL at 11:00

## 2020-05-13 RX ADMIN — CEFAZOLIN SCH MLS/HR: 330 INJECTION, POWDER, FOR SOLUTION INTRAMUSCULAR; INTRAVENOUS at 16:57

## 2020-05-13 RX ADMIN — PANTOPRAZOLE SODIUM SCH MG: 40 TABLET, DELAYED RELEASE ORAL at 08:23

## 2020-05-13 RX ADMIN — FAMOTIDINE SCH MG: 20 TABLET, FILM COATED ORAL at 20:19

## 2020-05-13 RX ADMIN — LOSARTAN POTASSIUM SCH MG: 50 TABLET, FILM COATED ORAL at 08:22

## 2020-05-13 RX ADMIN — HEPARIN SODIUM SCH UNIT: 5000 INJECTION, SOLUTION INTRAVENOUS; SUBCUTANEOUS at 20:19

## 2020-05-13 RX ADMIN — PIPERACILLIN AND TAZOBACTAM SCH MLS/HR: 3; .375 INJECTION, POWDER, FOR SOLUTION INTRAVENOUS at 12:55

## 2020-05-13 RX ADMIN — AMPICILLIN SODIUM AND SULBACTAM SODIUM SCH MLS/HR: 1; .5 INJECTION, POWDER, FOR SOLUTION INTRAMUSCULAR; INTRAVENOUS at 16:57

## 2020-05-13 RX ADMIN — Medication SCH MG: at 20:19

## 2020-05-13 RX ADMIN — CEFAZOLIN SCH: 330 INJECTION, POWDER, FOR SOLUTION INTRAMUSCULAR; INTRAVENOUS at 04:23

## 2020-05-13 RX ADMIN — Medication SCH MG: at 08:22

## 2020-05-13 RX ADMIN — MEMANTINE HYDROCHLORIDE SCH MG: 5 TABLET ORAL at 08:22

## 2020-05-13 NOTE — P.PN
Subjective





this is a pleasant 86 years old female with past medical history of dementia, 

diabetes mellitus, hypertension.  Presents with altered mental status.  Patient 

could not provide information that it was obtained from the chart and staff.  It

looks like patient admitted for become less responsive and in view of her 

history of dementia


patient is afebrile and vitals are stable.  Showed leukocytosis of 12.7 

K,creatinine 1.26, baseline creatinine is 0.8-1.2.


EKG showed normal sinus rhythm at 65 BPM, , no ST-T changes.  Chest x-ray

no acute process by radiologist.


in the emergency room patient was started on Zosyn and Levaquin








05/13/2020


patient is more awake today however when we asked the patient she answers 

inappropriately and with unrelated subject


discussed the case with her PCP Dr. Sidhu yesterday over the phone, apparently 

the patient has been treated for ESBL UTI with meropenem and after failure of 

treatment and the duration of her mental status was transferred to the hospital,

there is urine cultures pending from her [facility], 

yesterday discussed with the staff to obtain the records for the urine culture.


today she has low temperature of 97.0, risks of Vitas looks stable.  She is 

saturating 92% on room air.


she has persistent leukocytosis of 12.40.  Creatinine is back to normal at 1.01


urine culture is growing group D enterococcus


Infectious disease has been consulted





review of systems:N/a, patient could not provide information


                               Active Medications











Generic Name Dose Route Start Last Admin





  Trade Name Freq  PRN Reason Stop Dose Admin


 


Acetaminophen  650 mg  05/12/20 04:13 





  Tylenol Tab  PO  





  Q6HR PRN  





  Mild Pain or Fever > 100.5  


 


Amlodipine Besylate  10 mg  05/12/20 09:00  05/13/20 08:23





  Norvasc  PO   10 mg





  DAILY NELLA   Administration


 


Atenolol  100 mg  05/12/20 09:00  05/13/20 08:22





  Tenormin  PO   100 mg





  DAILY NELLA   Administration


 


Donepezil HCl  10 mg  05/12/20 21:00  05/12/20 21:19





  Aricept  PO   10 mg





  HS NELLA   Administration


 


Famotidine  20 mg  05/13/20 21:00 





  Pepcid  PO  





  HS NELLA  


 


Heparin Sodium (Porcine)  5,000 unit  05/12/20 21:00  05/13/20 08:24





  Heparin  SQ   5,000 unit





  Q12HR NELLA   Administration


 


Sodium Chloride  1,000 mls @ 20 mls/hr  05/12/20 04:15  05/13/20 04:23





  Saline 0.9%  IV   Not Given





  .Q24H NELLA  


 


Levofloxacin 750 mg/ IV  150 mls @ 100 mls/hr  05/14/20 05:00 





  Solution  IVPB  





  Q48H NELLA  


 


Sodium Chloride  1,000 mls @ 50 mls/hr  05/12/20 11:30  05/12/20 21:18





  Saline 0.9%  IV   50 mls/hr





  .Q20H NELLA   Administration


 


Piperacillin Sod/Tazobactam  100 mls @ 25 mls/hr  05/12/20 20:00  05/13/20 12:55





  Sod 3.375 gm/ Sodium Chloride  IVPB   25 mls/hr





  Q8H NELLA   Administration


 


Losartan Potassium  50 mg  05/12/20 09:00  05/13/20 08:22





  Cozaar  PO   50 mg





  DAILY NELLA   Administration


 


Magnesium Oxide  400 mg  05/12/20 09:00  05/13/20 08:22





  Mag-Ox  PO   400 mg





  BID NELLA   Administration


 


Memantine  5 mg  05/12/20 09:00  05/13/20 08:22





  Namenda  PO   5 mg





  BID NELLA   Administration


 


Naloxone HCl  0.2 mg  05/12/20 04:11 





  Narcan  IV  





  Q2M PRN  





  Opioid Reversal  


 


Non-Formulary Medication  0.6 mg  05/12/20 09:00  05/13/20 11:00





  Liraglutide [Victoza 2-Yayo]  SQ   Not Given





  DAILY NELLA  


 


Pantoprazole Sodium  40 mg  05/12/20 09:00  05/13/20 08:23





  Protonix  PO   40 mg





  DAILY NELLA   Administration














Objective





- Vital Signs


Vital signs: 


                                   Vital Signs











Temp  97.0 F L  05/13/20 07:00


 


Pulse  75   05/13/20 08:00


 


Resp  17   05/13/20 08:00


 


BP  120/96   05/13/20 07:00


 


Pulse Ox  92 L  05/13/20 07:00








                                 Intake & Output











 05/12/20 05/13/20 05/13/20





 18:59 06:59 18:59


 


Intake Total  240 


 


Output Total 800  3


 


Balance -800 240 -3


 


Intake:   


 


  Intake, IV Titration  240 





  Amount   


 


    Sodium Chloride 0.9% 1,  240 





    000 ml @ 20 mls/hr IV .   





    Q24H [facility] Rx#:[id number]   


 


Output:   


 


  Urine 800  


 


  Emesis   3


 


Other:   


 


  # Voids 1  


 


  # Bowel Movements 1  1














- Labs


CBC & Chem 7: 


                                 05/13/20 06:19





                                 05/13/20 06:19


Labs: 


                  Abnormal Lab Results - Last 24 Hours (Table)











  05/12/20 05/13/20 05/13/20 Range/Units





  16:30 06:19 06:19 


 


WBC   12.4 H   (3.8-10.6)  k/uL


 


Plt Count   480 H   (150-450)  k/uL


 


Neutrophils #   9.6 H   (1.3-7.7)  k/uL


 


BUN    23 H  (7-17)  mg/dL


 


Glucose    69 L  (74-99)  mg/dL


 


POC Glucose (mg/dL)  72 L    (75-99)  mg/dL














  05/13/20 Range/Units





  07:10 


 


WBC   (3.8-10.6)  k/uL


 


Plt Count   (150-450)  k/uL


 


Neutrophils #   (1.3-7.7)  k/uL


 


BUN   (7-17)  mg/dL


 


Glucose   (74-99)  mg/dL


 


POC Glucose (mg/dL)  73 L  (75-99)  mg/dL








                      Microbiology - Last 24 Hours (Table)











 05/12/20 03:01 Urine Culture - Preliminary





 Urine,Clean Catch    Group D Enterococcus


 


 05/12/20 03:28 Blood Culture - Preliminary





 Blood    No Growth after 24 hours














Assessment and Plan


Assessment: 





Acute urinary tract infection, secondary to group D enterococcus


Metabolic encephalopathy secondary to above


acute kidney injury


Diabetes mellitus


Hypertension
































Plan: 





this is a pleasant 86 years old female who presents with UTI and encephalopathy.

 Continue with Levaquin.  stop Zosyn.follow-up urine culture.  Continue with 

gentle hydration and normal saline at 50 mL per hour.  Follow-up SARS to test 

for covid-19 as it is as it is pandemic time


Labs and medication were reviewed..  Continue same treatment.  Continue with 

symptomatic treatment.  Resume home medication.  Monitor lytes and vitals.  DVT 

and GI prophylaxis.  Further recommendations of the clinical course of the 

patient


DVT prophylaxis: Subcutaneous heparin


GI Prophylaxis: Pepcid


Prognosis is guarded

## 2020-05-14 LAB
ANION GAP SERPL CALC-SCNC: 7 MMOL/L
BASOPHILS # BLD AUTO: 0.1 K/UL (ref 0–0.2)
BASOPHILS NFR BLD AUTO: 1 %
BUN SERPL-SCNC: 20 MG/DL (ref 7–17)
CALCIUM SPEC-MCNC: 8.9 MG/DL (ref 8.4–10.2)
CHLORIDE SERPL-SCNC: 108 MMOL/L (ref 98–107)
CO2 SERPL-SCNC: 27 MMOL/L (ref 22–30)
EOSINOPHIL # BLD AUTO: 0.5 K/UL (ref 0–0.7)
EOSINOPHIL NFR BLD AUTO: 6 %
ERYTHROCYTE [DISTWIDTH] IN BLOOD BY AUTOMATED COUNT: 4.48 M/UL (ref 3.8–5.4)
ERYTHROCYTE [DISTWIDTH] IN BLOOD: 14.8 % (ref 11.5–15.5)
GLUCOSE BLD-MCNC: 112 MG/DL (ref 75–99)
GLUCOSE BLD-MCNC: 188 MG/DL (ref 75–99)
GLUCOSE BLD-MCNC: 197 MG/DL (ref 75–99)
GLUCOSE BLD-MCNC: 76 MG/DL (ref 75–99)
GLUCOSE SERPL-MCNC: 76 MG/DL (ref 74–99)
HCT VFR BLD AUTO: 39.3 % (ref 34–46)
HGB BLD-MCNC: 12.1 GM/DL (ref 11.4–16)
LYMPHOCYTES # SPEC AUTO: 2.3 K/UL (ref 1–4.8)
LYMPHOCYTES NFR SPEC AUTO: 27 %
MCH RBC QN AUTO: 27.1 PG (ref 25–35)
MCHC RBC AUTO-ENTMCNC: 30.8 G/DL (ref 31–37)
MCV RBC AUTO: 87.8 FL (ref 80–100)
MONOCYTES # BLD AUTO: 0.5 K/UL (ref 0–1)
MONOCYTES NFR BLD AUTO: 6 %
NEUTROPHILS # BLD AUTO: 5.1 K/UL (ref 1.3–7.7)
NEUTROPHILS NFR BLD AUTO: 60 %
PLATELET # BLD AUTO: 413 K/UL (ref 150–450)
POTASSIUM SERPL-SCNC: 4.1 MMOL/L (ref 3.5–5.1)
SODIUM SERPL-SCNC: 142 MMOL/L (ref 137–145)
WBC # BLD AUTO: 8.5 K/UL (ref 3.8–10.6)

## 2020-05-14 RX ADMIN — HEPARIN SODIUM SCH UNIT: 5000 INJECTION, SOLUTION INTRAVENOUS; SUBCUTANEOUS at 20:19

## 2020-05-14 RX ADMIN — CEFAZOLIN SCH: 330 INJECTION, POWDER, FOR SOLUTION INTRAMUSCULAR; INTRAVENOUS at 04:35

## 2020-05-14 RX ADMIN — ASPIRIN SCH: 325 TABLET ORAL at 07:56

## 2020-05-14 RX ADMIN — MEMANTINE HYDROCHLORIDE SCH MG: 5 TABLET ORAL at 08:00

## 2020-05-14 RX ADMIN — PANTOPRAZOLE SODIUM SCH MG: 40 TABLET, DELAYED RELEASE ORAL at 08:00

## 2020-05-14 RX ADMIN — LOSARTAN POTASSIUM SCH MG: 50 TABLET, FILM COATED ORAL at 08:00

## 2020-05-14 RX ADMIN — AMPICILLIN SODIUM AND SULBACTAM SODIUM SCH MLS/HR: 1; .5 INJECTION, POWDER, FOR SOLUTION INTRAMUSCULAR; INTRAVENOUS at 11:31

## 2020-05-14 RX ADMIN — ATENOLOL SCH MG: 50 TABLET ORAL at 08:00

## 2020-05-14 RX ADMIN — Medication SCH MG: at 20:18

## 2020-05-14 RX ADMIN — CEFAZOLIN SCH MLS/HR: 330 INJECTION, POWDER, FOR SOLUTION INTRAMUSCULAR; INTRAVENOUS at 20:19

## 2020-05-14 RX ADMIN — AMPICILLIN SODIUM AND SULBACTAM SODIUM SCH MLS/HR: 1; .5 INJECTION, POWDER, FOR SOLUTION INTRAMUSCULAR; INTRAVENOUS at 00:27

## 2020-05-14 RX ADMIN — DONEPEZIL HYDROCHLORIDE SCH MG: 10 TABLET ORAL at 20:19

## 2020-05-14 RX ADMIN — HEPARIN SODIUM SCH UNIT: 5000 INJECTION, SOLUTION INTRAVENOUS; SUBCUTANEOUS at 08:00

## 2020-05-14 RX ADMIN — Medication SCH MG: at 08:00

## 2020-05-14 RX ADMIN — FAMOTIDINE SCH MG: 20 TABLET, FILM COATED ORAL at 20:18

## 2020-05-14 RX ADMIN — MEMANTINE HYDROCHLORIDE SCH MG: 5 TABLET ORAL at 20:19

## 2020-05-14 RX ADMIN — SODIUM CHLORIDE SCH MLS/HR: 9 INJECTION, SOLUTION INTRAVENOUS at 14:21

## 2020-05-14 RX ADMIN — AMPICILLIN SODIUM AND SULBACTAM SODIUM SCH MLS/HR: 1; .5 INJECTION, POWDER, FOR SOLUTION INTRAMUSCULAR; INTRAVENOUS at 05:29

## 2020-05-14 RX ADMIN — CEFAZOLIN SCH: 330 INJECTION, POWDER, FOR SOLUTION INTRAMUSCULAR; INTRAVENOUS at 20:19

## 2020-05-14 NOTE — P.CONS
History of Present Illness





- Reason for Consult


Consult date: 05/13/20


UTI


Requesting physician: Harshil E Sheet





- Chief Complaint


Mental status changes x 1 day





- History of Present Illness


Patient is 86-year  female nursing home resident who has been sent to 

the ER at [facility] yesterday morning for evaluation of mental s

tatus changes patient to have underlying dementia however the patient has not 

has been seen to be off her baseline patient did have a positive UA with concern

for urinary tract infection and previous history of ESBL E. coli she has been 

sent to the ER for further management of her underlying urine tract infection on

arrival to the ER the patient was afebrile with 1 low temperature of 95.4 

patient did have a white count of 12.4 she did have a positive UA corona PCR was

negative chest x-ray was negative for any acute infiltrate patient has been 

admitted to the hospital she was started on Levaquin and Zosyn infectious was 

consulted for further recommendation of antibiotic therapy most information most

information has been obtained from review the chart has been herself is unable 

to provide any reliable history because of underlying dementia.











Review of Systems





Positive point has been mentioned in HPI complete review could not be obtained 

because of  underlying mental status











Past Medical History


Past Medical History: Dementia, Diabetes Mellitus, Hypertension


Additional Past Medical History / Comment(s): uti-ecoli


History of Any Multi-Drug Resistant Organisms: ESBL


Year Discovered:: 10/8/17


MDRO Source:: URINE ESBL


Past Surgical History: Cholecystectomy


Past Anesthesia/Blood Transfusion Reactions: No Reported Reaction


Past Psychological History: No Psychological Hx Reported


Smoking Status: Never smoker


Past Alcohol Use History: None Reported


Past Drug Use History: None Reported





- Past Family History


  ** Father


History Unknown: Yes





  ** Mother


History Unknown: Yes





Medications and Allergies


                                Home Medications











 Medication  Instructions  Recorded  Confirmed  Type


 


Donepezil [Aricept] 10 mg PO HS@2100 10/08/17 05/12/20 History


 


Losartan Potassium 50 mg PO DAILY@0900 10/08/17 05/12/20 History


 


amLODIPine [Norvasc] 10 mg PO DAILY@0900 10/08/17 05/12/20 History


 


Magnesium Oxide [Mag-Ox] 400 mg PO BID@0900,2100 03/21/18 05/12/20 History


 


Memantine [Namenda] 5 mg PO BID@0900,2100 03/21/18 05/12/20 History


 


Pantoprazole [Protonix] 40 mg PO HS@2100 03/21/18 05/12/20 History


 


Losartan [Cozaar] 25 mg PO DAILY@0900 11/02/18 05/12/20 History


 


guaiFENesin SYRUP 100MG/5ML 200 mg PO Q4H PRN 11/02/18 05/12/20 History





[Robitussin]    


 


Acetaminophen [Tylenol] 650 mg PO Q6H PRN 05/12/20 05/12/20 History


 


Cholecalciferol [Vitamin D3 (25 2,000 unit PO HS@2100 05/12/20 05/12/20 History





Mcg = 1000 Iu)]    


 


Dicyclomine [Bentyl] 20 mg PO QID 05/12/20 05/12/20 History


 


Ertapenem [INVanz] 1 gram IV HS@2100 05/12/20 05/12/20 History


 


Insulin Aspart [NovoLOG] 4 units SQ BID@1200,1700 05/12/20 05/12/20 History


 


Insulin Degludec [Tresiba 30 unit SQ HS@2100 05/12/20 05/12/20 History





Flextouch U-200]    


 


Liraglutide [Victoza 3-Yayo] 1.2 mg SQ HS@2100 05/12/20 05/12/20 History


 


Metoprolol Tartrate [Lopressor] 50 mg PO BID@0900,2100 05/12/20 05/12/20 History


 


metFORMIN HCL ER [Glucophage Xr] 1,000 mg PO DAILY@0900 05/12/20 05/12/20 

History








                                    Allergies











Allergy/AdvReac Type Severity Reaction Status Date / Time


 


No Known Allergies Allergy   Verified 05/12/20 10:23














Physical Exam


Vitals: 


                                   Vital Signs











  Temp Pulse Resp BP Pulse Ox


 


 05/13/20 14:44  98.4 F  71  17  136/80  99


 


 05/13/20 08:00   75  17  


 


 05/13/20 07:00  97.0 F L  75  17  120/96  92 L


 


 05/13/20 04:00    15  


 


 05/13/20 02:35  97.9 F  73  20  99/57  98


 


 05/13/20 00:00    15  


 


 05/12/20 20:40  97.7 F  65  20  135/91  95


 


 05/12/20 16:00    15  








                                Intake and Output











 05/12/20 05/13/20 05/13/20





 22:59 06:59 14:59


 


Intake Total  240 


 


Output Total 400  403


 


Balance -400 240 -403


 


Intake:   


 


  Intake, IV Titration  240 





  Amount   


 


    Sodium Chloride 0.9% 1,  240 





    000 ml @ 20 mls/hr IV .   





    Q24H [facility] Rx#:[id number]   


 


Output:   


 


  Urine 400  400


 


  Emesis   3


 


Other:   


 


  # Voids 1  


 


  # Bowel Movements 1  1











GENERAL DESCRIPTION: Elderly female lying in bed, no distress. No tachypnea or 

accessory muscle of respiration use.


HEENT: Shows Pallor , no scleral icterus. Oral mucous membrane is dry.


NECK: Trachea central, no thyromegaly.


LUNGS: Unlabored breathing. Clear to auscultation anteriorly. No wheeze or 

crackle.


HEART: S1, S2, regular rate and rhythm.


ABDOMEN: Soft, no tenderness , guarding or rigidity


EXTREMITIES: No edema of feet.


SKIN: No rash, no masses palpable.


NEUROLOGICAL: The patient is awake, orientation could not come in because of 

underlying dementia.











Results


CBC & Chem 7: 


                                 05/13/20 06:19





                                 05/13/20 06:19


Labs: 


                  Abnormal Lab Results - Last 24 Hours (Table)











  05/12/20 05/13/20 05/13/20 Range/Units





  16:30 06:19 06:19 


 


WBC   12.4 H   (3.8-10.6)  k/uL


 


Plt Count   480 H   (150-450)  k/uL


 


Neutrophils #   9.6 H   (1.3-7.7)  k/uL


 


BUN    23 H  (7-17)  mg/dL


 


Glucose    69 L  (74-99)  mg/dL


 


POC Glucose (mg/dL)  72 L    (75-99)  mg/dL














  05/13/20 Range/Units





  07:10 


 


WBC   (3.8-10.6)  k/uL


 


Plt Count   (150-450)  k/uL


 


Neutrophils #   (1.3-7.7)  k/uL


 


BUN   (7-17)  mg/dL


 


Glucose   (74-99)  mg/dL


 


POC Glucose (mg/dL)  73 L  (75-99)  mg/dL








                      Microbiology - Last 24 Hours (Table)











 05/12/20 03:01 Urine Culture - Preliminary





 Urine,Clean Catch    Group D Enterococcus


 


 05/12/20 03:28 Blood Culture - Preliminary





 Blood    No Growth after 24 hours














Assessment and Plan


Assessment: 


patient presented hospital with mental status changes likely multifactorial in 

this patient who did have history of recurrent UTI and a positive UA likely a 

component of symptomatic related infection with urine is currently showing ente

rococcus and not a gram-negative pathogen





(1) UTI (urinary tract infection)


Current Visit: Yes   Status: Acute   Code(s): N39.0 - URINARY TRACT INFECTION, 

SITE NOT SPECIFIED   SNOMED Code(s): 27699075


   


Plan: 


1-discontinue Levaquin and Zosyn


2-start the patient on Unasyn 1.5 g every 6 hours


We will follow on clinical condition and cultures to further adjust medication 

if needed


Thank you for this consultation we will follow the patient along with you





Time with Patient: Greater than 30

## 2020-05-14 NOTE — P.PN
Progress Note - Text


Progress Note Date: 05/14/20





Presenting complaint:


Altered mental status





History of presenting complaint:


this is a pleasant 86 years old female with past medical history of dementia, 

diabetes mellitus, hypertension.  Presents with altered mental status.  Patient 

could not provide information that it was obtained from the chart and staff.  It

looks like patient admitted for become less responsive and in view of her 

history of dementia


Admitted with-acute UTI, metabolic encephalopathy, acute kidney injury.


Today-oral intake is fair.  Afebrile.  Tired.





Review of systems: Attempted for constitutional, cardiovascular, GI, pulmonary. 

relevant finding as above





Active Medications





Acetaminophen (Tylenol Tab)  650 mg PO Q6HR PRN


   PRN Reason: Mild Pain or Fever > 100.5


Amlodipine Besylate (Norvasc)  10 mg PO DAILY [facility]


   Last Admin: 05/14/20 08:00 Dose:  10 mg


   Documented by: 


Atenolol (Tenormin)  100 mg PO DAILY [facility]


   Last Admin: 05/14/20 08:00 Dose:  100 mg


   Documented by: 


Donepezil HCl (Aricept)  10 mg PO HS [facility]


   Last Admin: 05/13/20 20:19 Dose:  10 mg


   Documented by: 


Famotidine (Pepcid)  20 mg PO HS [facility]


   Last Admin: 05/13/20 20:19 Dose:  20 mg


   Documented by: 


Heparin Sodium (Porcine) (Heparin)  5,000 unit SQ Q12HR [facility]


   Last Admin: 05/14/20 08:00 Dose:  5,000 unit


   Documented by: 


Sodium Chloride (Saline 0.9%)  1,000 mls @ 20 mls/hr IV .Q24H [facility]


   Last Admin: 05/14/20 04:35 Dose:  Not Given


   Documented by: 


Sodium Chloride (Saline 0.9%)  1,000 mls @ 50 mls/hr IV .Q20H [facility]


   Last Admin: 05/14/20 04:35 Dose:  Not Given


   Documented by: 


Vancomycin HCl 1,000 mg/ (Sodium Chloride)  250 mls @ 125 mls/hr IVPB DAILY [facility]


   Last Admin: 05/14/20 14:21 Dose:  125 mls/hr


   Documented by: 


Losartan Potassium (Cozaar)  50 mg PO DAILY [facility]


   Last Admin: 05/14/20 08:00 Dose:  50 mg


   Documented by: 


Magnesium Oxide (Mag-Ox)  400 mg PO BID [facility]


   Last Admin: 05/14/20 08:00 Dose:  400 mg


   Documented by: 


Memantine (Namenda)  5 mg PO BID [facility]


   Last Admin: 05/14/20 08:00 Dose:  5 mg


   Documented by: 


Naloxone HCl (Narcan)  0.2 mg IV Q2M PRN


   PRN Reason: Opioid Reversal


Non-Formulary Medication (Liraglutide [Victoza 2-Yayo])  0.6 mg SQ DAILY [facility]


   Last Admin: 05/14/20 07:56 Dose:  Not Given


   Documented by: 


Pantoprazole Sodium (Protonix)  40 mg PO DAILY [facility]


   Last Admin: 05/14/20 08:00 Dose:  40 mg


   Documented by: 





On examination:


VITAL SIGNS: 97.3, 51, 18, 124/74, 96% on room air


GENERAL APPEARANCE: Laying in bed, not in distress. 


HEENT: Normal external appearance of nose and ear.  Oral cavity normal


EYES: Pupils equal. Conjunctiva normal. 


NECK: JVD not raised. Mass not palpable. 


RESPIRATORY: Respiratory effort normal. Lungs clear to auscultation. 


CARDIOVASCULAR: First and second sounds normal. No edema. 


ABDOMEN: Soft. Liver and spleen not palpable. No tenderness. No mass palpable. 


PSYCHIATRY: Answering some questions





INVESTIGATIONS, reviewed in the clinical context:


White count 8.5 hemoglobin 12.1 potassium 4.1 crit and 0.94





Previous testing:


Urine culture growing enterococcus faecium





Assessment:


-Acute UTI from cystitis from enterococcus faecium


-Acute metabolic encephalopathy from above


-Late onset Alzheimer's dementia


-Diabetes mellitus type 2


-Essential hypertension





Plan:


Continue current medication treatment plan.  On vancomycin per ID.  Follow

## 2020-05-14 NOTE — PN
PROGRESS NOTE



DATE OF SERVICE:

05/14/2020



REASON FOR FOLLOWUP:

Urinary tract infection.



INTERVAL HISTORY:

The patient is currently afebrile. The patient is breathing comfortably on room air.

She is hemodynamically stable. The patient remains sleepy and lethargic. She did wake

up but did not provide any history.



PHYSICAL EXAMINATION:

Blood pressure 135/88 with a pulse of 52, temperature 97.7. She is 100% on room air.

General description is an elderly female lying in bed in no distress.

RESPIRATORY SYSTEM: Unlabored breathing. Clear to auscultation anteriorly.

HEART: S1, S2.  Regular rate and rhythm.

ABDOMEN: Soft. No tenderness.



LABS:

Wound is showing Enterococcus faecium, sensitive to vancomycin.



DIAGNOSTIC IMPRESSION AND PLAN:

Patient admitted to hospital with mental status changes, multifactorial, in this

patient who did have a component of urinary tract infection. Urine has been

Enterococcus faecium, sensitive to Vancomycin.  Antibiotic has been switched to

vancomycin. Will monitor her clinical course closely.





MMODL / IJN: [id number] / Job#: [id number]

## 2020-05-15 VITALS
SYSTOLIC BLOOD PRESSURE: 143 MMHG | DIASTOLIC BLOOD PRESSURE: 63 MMHG | TEMPERATURE: 97.4 F | RESPIRATION RATE: 16 BRPM | HEART RATE: 55 BPM

## 2020-05-15 LAB
ANION GAP SERPL CALC-SCNC: 11 MMOL/L
BASOPHILS # BLD AUTO: 0 K/UL (ref 0–0.2)
BASOPHILS NFR BLD AUTO: 0 %
BUN SERPL-SCNC: 17 MG/DL (ref 7–17)
CALCIUM SPEC-MCNC: 8.8 MG/DL (ref 8.4–10.2)
CHLORIDE SERPL-SCNC: 108 MMOL/L (ref 98–107)
CO2 SERPL-SCNC: 24 MMOL/L (ref 22–30)
EOSINOPHIL # BLD AUTO: 0.4 K/UL (ref 0–0.7)
EOSINOPHIL NFR BLD AUTO: 5 %
ERYTHROCYTE [DISTWIDTH] IN BLOOD BY AUTOMATED COUNT: 4.37 M/UL (ref 3.8–5.4)
ERYTHROCYTE [DISTWIDTH] IN BLOOD: 14.3 % (ref 11.5–15.5)
GLUCOSE BLD-MCNC: 119 MG/DL (ref 75–99)
GLUCOSE BLD-MCNC: 180 MG/DL (ref 75–99)
GLUCOSE SERPL-MCNC: 124 MG/DL (ref 74–99)
GLUCOSE UR QL: (no result)
HCT VFR BLD AUTO: 37.8 % (ref 34–46)
HGB BLD-MCNC: 12.2 GM/DL (ref 11.4–16)
KETONES UR QL STRIP.AUTO: (no result)
LYMPHOCYTES # SPEC AUTO: 2.1 K/UL (ref 1–4.8)
LYMPHOCYTES NFR SPEC AUTO: 22 %
MCH RBC QN AUTO: 28 PG (ref 25–35)
MCHC RBC AUTO-ENTMCNC: 32.4 G/DL (ref 31–37)
MCV RBC AUTO: 86.4 FL (ref 80–100)
MONOCYTES # BLD AUTO: 0.6 K/UL (ref 0–1)
MONOCYTES NFR BLD AUTO: 6 %
NEUTROPHILS # BLD AUTO: 6.1 K/UL (ref 1.3–7.7)
NEUTROPHILS NFR BLD AUTO: 66 %
PH UR: 6 [PH] (ref 5–8)
PLATELET # BLD AUTO: 472 K/UL (ref 150–450)
POTASSIUM SERPL-SCNC: 4 MMOL/L (ref 3.5–5.1)
PROT UR QL: (no result)
RBC UR QL: 3 /HPF (ref 0–5)
SODIUM SERPL-SCNC: 143 MMOL/L (ref 137–145)
SP GR UR: 1.02 (ref 1–1.03)
SQUAMOUS UR QL AUTO: 2 /HPF (ref 0–4)
UROBILINOGEN UR QL STRIP: <2 MG/DL (ref ?–2)
WBC # BLD AUTO: 9.3 K/UL (ref 3.8–10.6)
WBC # UR AUTO: 10 /HPF (ref 0–5)

## 2020-05-15 RX ADMIN — HEPARIN SODIUM SCH UNIT: 5000 INJECTION, SOLUTION INTRAVENOUS; SUBCUTANEOUS at 08:07

## 2020-05-15 RX ADMIN — LOSARTAN POTASSIUM SCH MG: 50 TABLET, FILM COATED ORAL at 08:07

## 2020-05-15 RX ADMIN — SODIUM CHLORIDE SCH MLS/HR: 9 INJECTION, SOLUTION INTRAVENOUS at 08:07

## 2020-05-15 RX ADMIN — Medication SCH MG: at 08:07

## 2020-05-15 RX ADMIN — MEMANTINE HYDROCHLORIDE SCH MG: 5 TABLET ORAL at 08:08

## 2020-05-15 RX ADMIN — ASPIRIN SCH: 325 TABLET ORAL at 08:02

## 2020-05-15 RX ADMIN — PANTOPRAZOLE SODIUM SCH MG: 40 TABLET, DELAYED RELEASE ORAL at 08:08

## 2020-05-15 RX ADMIN — ATENOLOL SCH MG: 50 TABLET ORAL at 08:07

## 2020-05-15 NOTE — PN
PROGRESS NOTE



DATE OF SERVICE:

05/15/2020



REASON FOR FOLLOW UP:

Enterococcus urinary tract infection.



INTERVAL HISTORY:

The patient is afebrile.  The patient is more awake and alert today.  She is breathing

comfortably.  No chest pain or cough.  No abdominal pain or any new symptoms.



PHYSICAL EXAMINATION:

Blood pressure 142/63 with a pulse of 85, temperature 97.4.  She is 97% on room air.

General description is an elderly female lying in bed in no distress.

Respiratory system: Unlabored breathing.  Clear to auscultation anteriorly.

Heart S1, S2.  Regular rate and rhythm.

ABDOMEN:  Soft, no tenderness.



LABS:

White count 9.6, creatinine 0.72.



DIAGNOSTIC IMPRESSION AND PLAN:

Patient with Enterococcus faecium urinary  tract infection sensitive to vancomycin.

Overall improvement on Vanco was started yesterday, we will give her a 7 day course of

IV Vanco, pharmacy to dose through midline.  Continue supportive care.





MMODL / IJN: [id number] / Job#: [id number]

## 2020-05-15 NOTE — P.DS
Providers


Date of admission: 


05/14/20 08:24





Expected date of discharge: 05/15/20


Attending physician: 


Osmani Salgado





Consults: 





                                        





05/12/20 14:26


Consult Physician Routine 


   Consulting Provider: Hazel Wu


   Consult Reason/Comments: hx of esbl


   Do you want consulting provider notified?: Yes











Primary care physician: 


[facility] Course: 





Presenting complaint:


Altered mental status





History of presenting complaint:


this is a pleasant 86 years old female with past medical history of dementia, 

diabetes mellitus, hypertension.  Presents with altered mental status.  Patient 

could not provide information that it was obtained from the chart and staff.  It

looks like patient admitted for become less responsive and in view of her 

history of dementia


Admitted with-acute UTI, metabolic encephalopathy, acute kidney injury.urine 

culture positive for enterococcus-faecium.creatinine admission was 1.2 sick did 

drop down to 0.72.patient's Cozaar has been discontinued.


Today-eating okay.  Comfortable.  Discussed with Dr. Fernandez.  Patient will get a 

midline today.  And 7 days of vancomycin.  Will go to the rehab place.  

Discussed with the nurse.


Discussion and discharge planning more than 35 minutes





Consultation:


Dr. Wu from ID.











On examination:


VITAL SIGNS: 97.4, 55, 16, 143/63, 97% on room air


GENERAL APPEARANCE: sitting up, comfortable 


HEENT: Normal external appearance of nose and ear.  Oral cavity normal


EYES: Pupils equal. Conjunctiva normal. 


NECK: JVD not raised. Mass not palpable. 


RESPIRATORY: Respiratory effort normal. Lungs clear to auscultation. 


CARDIOVASCULAR: First and second sounds normal. No edema. 


ABDOMEN: Soft. Liver and spleen not palpable. No tenderness. No mass palpable. 


PSYCHIATRY: Answering some questions





INVESTIGATIONS, reviewed in the clinical context:


white count 9.3 hemoglobin 12.2 creatinine 0.7 to





Previous testing:


Urine culture growing enterococcus faecium


white count 12.7 pressure 4.5 bun 34 creatinine 1.26


COVID-19 PCR-not detected





Assessment:


-Acute UTI from cystitis from enterococcus faecium


-Acute metabolic encephalopathy from above


-Stefania cognitive impairment-Late onset Alzheimer's dementia


-Diabetes mellitus type 2


-Essential hypertension


-COVID-19 PCR-not detected





disposition:


F/[facility]





Patient Condition at Discharge: Stable





Plan - Discharge Summary


Discharge Rx Participant: No


New Discharge Prescriptions: 


New


   Vancomycin 1,000 mg IVPB DAILY #7 vial





Continue


   amLODIPine [Norvasc] 10 mg PO DAILY@0900


   Donepezil [Aricept] 10 mg PO HS@2100


   Losartan Potassium 50 mg PO DAILY@0900


   Magnesium Oxide [Mag-Ox] 400 mg PO BID@0900,2100


   Pantoprazole [Protonix] 40 mg PO HS@2100


   Memantine [Namenda] 5 mg PO BID@0900,2100


   Dicyclomine [Bentyl] 20 mg PO QID


   Liraglutide [Victoza 3-Yayo] 1.2 mg SQ HS@2100


   Metoprolol Tartrate [Lopressor] 50 mg PO BID@0900,2100


   Acetaminophen [Tylenol] 650 mg PO Q6H PRN


     PRN Reason: Pain Or Fever > 100.5





Changed


   metFORMIN HCL ER [Glucophage Xr] 500 mg PO BID #0


   Insulin Degludec [Tresiba Flextouch U-200] 12 unit SQ HS@2100 #0





Discontinued


   guaiFENesin SYRUP 100MG/5ML [Robitussin] 200 mg PO Q4H PRN


     PRN Reason: Cough


   Losartan [Cozaar] 25 mg PO DAILY@0900


   Insulin Aspart [NovoLOG] 4 units SQ BID@1200,1700


   Cholecalciferol [Vitamin D3 (25 Mcg = 1000 Iu)] 2,000 unit PO HS@2100


   Ertapenem [INVanz] 1 gram IV HS@2100


Discharge Medication List





Donepezil [Aricept] 10 mg PO HS@2100 10/08/17 [History]


Losartan Potassium 50 mg PO DAILY@0900 10/08/17 [History]


amLODIPine [Norvasc] 10 mg PO DAILY@0900 10/08/17 [History]


Magnesium Oxide [Mag-Ox] 400 mg PO BID@0900,2100 03/21/18 [History]


Memantine [Namenda] 5 mg PO BID@0900,2100 03/21/18 [History]


Pantoprazole [Protonix] 40 mg PO HS@2100 03/21/18 [History]


Acetaminophen [Tylenol] 650 mg PO Q6H PRN 05/12/20 [History]


Dicyclomine [Bentyl] 20 mg PO QID 05/12/20 [History]


Liraglutide [Victoza 3-Yayo] 1.2 mg SQ HS@2100 05/12/20 [History]


Metoprolol Tartrate [Lopressor] 50 mg PO BID@0900,2100 05/12/20 [History]


Insulin Degludec [Tresiba Flextouch U-200] 12 unit SQ HS@2100 #0 05/15/20 [Rx]


Vancomycin 1,000 mg IVPB DAILY #7 vial 05/15/20 [Rx]


metFORMIN HCL ER [Glucophage Xr] 500 mg PO BID #0 05/15/20 [Rx]








Follow up Appointment(s)/Referral(s): 


Kobe Sidhu MD [Primary Care Provider] - 1-2 days


[facility], [NON-STAFF] - As Needed


Patient Instructions/Handouts:  Altered Mental Status (ED)


Activity/Diet/Wound Care/Special Instructions: 


ac q AC-TID with ss insulin

## 2020-08-09 NOTE — ED
General Adult HPI





- General


Chief complaint: GI Bleed


Stated complaint: GI bleed


Time Seen by Provider: 08/09/20 07:43


Source: patient, EMS, RN notes reviewed


Mode of arrival: EMS


Limitations: altered mental status





- History of Present Illness


Initial comments: 





Patient is a pleasant 86-year-old female presenting from nursing home with 

reported GI bleed.  Patient had a single episode of blood mixed with stool.  

Patient is a poor historian and offers very little significant information.  

Patient states she feels fine and has no complaints.  No abdominal pain.  No 

vomiting.  No fever.





- Related Data


                                Home Medications











 Medication  Instructions  Recorded  Confirmed


 


Donepezil [Aricept] 10 mg PO HS@2100 10/08/17 08/09/20


 


Losartan Potassium 75 mg PO DAILY@0900 10/08/17 08/09/20


 


amLODIPine [Norvasc] 10 mg PO DAILY@0900 10/08/17 08/09/20


 


Magnesium Oxide [Mag-Ox] 400 mg PO BID@0900,2100 03/21/18 08/09/20


 


Memantine [Namenda] 5 mg PO BID@0900,2100 03/21/18 08/09/20


 


Pantoprazole [Protonix] 40 mg PO HS@2100 03/21/18 08/09/20


 


Acetaminophen [Tylenol] 650 mg PO Q6H PRN 05/12/20 08/09/20


 


Dicyclomine [Bentyl] 20 mg PO QID 05/12/20 08/09/20


 


Liraglutide [Victoza 3-Yayo] 1.2 mg SQ HS@2100 05/12/20 08/09/20


 


Metoprolol Tartrate [Lopressor] 50 mg PO BID@0900,2100 05/12/20 08/09/20


 


Clotrimazole/Betamethasone Dip 1 applic TOPICAL Q12H 08/09/20 08/09/20





[Lotrisone Cream]   


 


Ensure Clear 1 can PO TID@0900,1400,2000 08/09/20 08/09/20


 


Ellie Lotion 1 applic TOPICAL Q6H PRN 08/09/20 08/09/20


 


metFORMIN HCL ER [Glucophage Xr] 500 mg PO BID@0900,1700 08/09/20 08/09/20











                                    Allergies











Allergy/AdvReac Type Severity Reaction Status Date / Time


 


No Known Allergies Allergy   Verified 08/09/20 08:27














Review of Systems


ROS Statement: 


Those systems with pertinent positive or pertinent negative responses have been 

documented in the HPI.





ROS Other: All systems not noted in ROS Statement are negative.


Constitutional: Denies: fever


Eyes: Denies: eye pain


ENT: Denies: ear pain


Respiratory: Denies: cough


Cardiovascular: Denies: chest pain


Endocrine: Denies: fatigue


Gastrointestinal: Reports: as per HPI.  Denies: abdominal pain, nausea, vomiting


Genitourinary: Denies: dysuria


Musculoskeletal: Denies: back pain


Skin: Denies: rash


Neurological: Denies: weakness





Past Medical History


Past Medical History: Dementia, Diabetes Mellitus, GERD/Reflux, Hyperlipidemia, 

Hypertension


Additional Past Medical History / Comment(s): uti-ecoli, IBS, ulcerative colitis


History of Any Multi-Drug Resistant Organisms: ESBL


Date of last positivie culture/infection: 10/8/17


MDRO Source:: URINE ESBL


Past Surgical History: Cholecystectomy


Past Anesthesia/Blood Transfusion Reactions: No Reported Reaction


Past Psychological History: No Psychological Hx Reported


Smoking Status: Unknown if ever smoked


Past Alcohol Use History: None Reported


Past Drug Use History: None Reported





- Past Family History


  ** Father


History Unknown: Yes





  ** Mother


History Unknown: Yes





General Exam


Limitations: altered mental status


General appearance: alert, in no apparent distress


Head exam: Present: normocephalic


Eye exam: Present: normal appearance


Neck exam: Present: normal inspection


Respiratory exam: Present: normal lung sounds bilaterally


Cardiovascular Exam: Present: regular rate, normal rhythm


GI/Abdominal exam: Present: soft.  Absent: distended, tenderness, guarding, 

rebound, rigid


Rectal exam: Present: normal inspection.  Absent: bloody stool


External exam: Present: normal external exam


Extremities exam: Present: normal inspection


Neurological exam: Present: alert


Psychiatric exam: Present: normal affect, normal mood


Skin exam: Present: normal color





Course


                                   Vital Signs











  08/09/20





  07:41


 


Temperature 97.7 F


 


Pulse Rate 67


 


Respiratory 18





Rate 


 


Blood Pressure 152/76


 


O2 Sat by Pulse 98





Oximetry 














EKG Findings





- EKG Comments:


EKG Findings:: Normal sinus rhythm 67.  SD 14.  QRS 78.  .  .  

Normal axis.  Normal QRS.  No acute ST change.





Medical Decision Making





- Medical Decision Making





Patient reevaluated and updated.  Case discussed with Dr. Salgado, who will 

admit for Dr. Sidhu.





- Lab Data


Result diagrams: 


                                 08/09/20 08:09





                                 08/09/20 08:09


                                   Lab Results











  08/09/20 08/09/20 08/09/20 Range/Units





  08:09 08:09 08:09 


 


WBC  8.6    (3.8-10.6)  k/uL


 


RBC  4.05    (3.80-5.40)  m/uL


 


Hgb  11.2 L    (11.4-16.0)  gm/dL


 


Hct  35.6    (34.0-46.0)  %


 


MCV  88.0    (80.0-100.0)  fL


 


MCH  27.8    (25.0-35.0)  pg


 


MCHC  31.5    (31.0-37.0)  g/dL


 


RDW  16.3 H    (11.5-15.5)  %


 


Plt Count  484 H    (150-450)  k/uL


 


Neutrophils %  63    %


 


Lymphocytes %  26    %


 


Monocytes %  5    %


 


Eosinophils %  4    %


 


Basophils %  1    %


 


Neutrophils #  5.4    (1.3-7.7)  k/uL


 


Lymphocytes #  2.3    (1.0-4.8)  k/uL


 


Monocytes #  0.4    (0-1.0)  k/uL


 


Eosinophils #  0.3    (0-0.7)  k/uL


 


Basophils #  0.1    (0-0.2)  k/uL


 


Anisocytosis  Slight    


 


PT   9.8   (9.0-12.0)  sec


 


INR   0.9   (<1.2)  


 


APTT   23.1   (22.0-30.0)  sec


 


Sodium    138  (137-145)  mmol/L


 


Potassium    5.1  (3.5-5.1)  mmol/L


 


Chloride    108 H  ()  mmol/L


 


Carbon Dioxide    19 L  (22-30)  mmol/L


 


Anion Gap    11  mmol/L


 


BUN    26 H  (7-17)  mg/dL


 


Creatinine    1.39 H  (0.52-1.04)  mg/dL


 


Est GFR (CKD-EPI)AfAm    40  (>60 ml/min/1.73 sqM)  


 


Est GFR (CKD-EPI)NonAf    34  (>60 ml/min/1.73 sqM)  


 


Glucose    131 H  (74-99)  mg/dL


 


Calcium    9.2  (8.4-10.2)  mg/dL


 


Total Bilirubin    0.4  (0.2-1.3)  mg/dL


 


AST    19  (14-36)  U/L


 


ALT    9  (4-34)  U/L


 


Alkaline Phosphatase    74  ()  U/L


 


Total Protein    6.3  (6.3-8.2)  g/dL


 


Albumin    3.5  (3.5-5.0)  g/dL


 


Urine Color     


 


Urine Appearance     (Clear)  


 


Urine pH     (5.0-8.0)  


 


Ur Specific Gravity     (1.001-1.035)  


 


Urine Protein     (Negative)  


 


Urine Glucose (UA)     (Negative)  


 


Urine Ketones     (Negative)  


 


Urine Blood     (Negative)  


 


Urine Nitrite     (Negative)  


 


Urine Bilirubin     (Negative)  


 


Urine Urobilinogen     (<2.0)  mg/dL


 


Ur Leukocyte Esterase     (Negative)  


 


Urine RBC     (0-5)  /hpf


 


Urine WBC     (0-5)  /hpf


 


Urine WBC Clumps     (None)  /hpf


 


Urine Bacteria     (None)  /hpf


 


Hyaline Casts     (0-2)  /lpf


 


Urine Mucus     (None)  /hpf


 


Stool Occult Blood     (Negative)  














  08/09/20 08/09/20 Range/Units





  08:09 08:09 


 


WBC    (3.8-10.6)  k/uL


 


RBC    (3.80-5.40)  m/uL


 


Hgb    (11.4-16.0)  gm/dL


 


Hct    (34.0-46.0)  %


 


MCV    (80.0-100.0)  fL


 


MCH    (25.0-35.0)  pg


 


MCHC    (31.0-37.0)  g/dL


 


RDW    (11.5-15.5)  %


 


Plt Count    (150-450)  k/uL


 


Neutrophils %    %


 


Lymphocytes %    %


 


Monocytes %    %


 


Eosinophils %    %


 


Basophils %    %


 


Neutrophils #    (1.3-7.7)  k/uL


 


Lymphocytes #    (1.0-4.8)  k/uL


 


Monocytes #    (0-1.0)  k/uL


 


Eosinophils #    (0-0.7)  k/uL


 


Basophils #    (0-0.2)  k/uL


 


Anisocytosis    


 


PT    (9.0-12.0)  sec


 


INR    (<1.2)  


 


APTT    (22.0-30.0)  sec


 


Sodium    (137-145)  mmol/L


 


Potassium    (3.5-5.1)  mmol/L


 


Chloride    ()  mmol/L


 


Carbon Dioxide    (22-30)  mmol/L


 


Anion Gap    mmol/L


 


BUN    (7-17)  mg/dL


 


Creatinine    (0.52-1.04)  mg/dL


 


Est GFR (CKD-EPI)AfAm    (>60 ml/min/1.73 sqM)  


 


Est GFR (CKD-EPI)NonAf    (>60 ml/min/1.73 sqM)  


 


Glucose    (74-99)  mg/dL


 


Calcium    (8.4-10.2)  mg/dL


 


Total Bilirubin    (0.2-1.3)  mg/dL


 


AST    (14-36)  U/L


 


ALT    (4-34)  U/L


 


Alkaline Phosphatase    ()  U/L


 


Total Protein    (6.3-8.2)  g/dL


 


Albumin    (3.5-5.0)  g/dL


 


Urine Color   Yellow  


 


Urine Appearance   Cloudy H  (Clear)  


 


Urine pH   5.0  (5.0-8.0)  


 


Ur Specific Gravity   1.020  (1.001-1.035)  


 


Urine Protein   1+ H  (Negative)  


 


Urine Glucose (UA)   Negative  (Negative)  


 


Urine Ketones   Negative  (Negative)  


 


Urine Blood   Trace H  (Negative)  


 


Urine Nitrite   Positive H  (Negative)  


 


Urine Bilirubin   Negative  (Negative)  


 


Urine Urobilinogen   <2.0  (<2.0)  mg/dL


 


Ur Leukocyte Esterase   Large H  (Negative)  


 


Urine RBC   5  (0-5)  /hpf


 


Urine WBC   147 H  (0-5)  /hpf


 


Urine WBC Clumps   Few H  (None)  /hpf


 


Urine Bacteria   Many H  (None)  /hpf


 


Hyaline Casts   1  (0-2)  /lpf


 


Urine Mucus   Rare H  (None)  /hpf


 


Stool Occult Blood  Positive H   (Negative)  














Disposition


Clinical Impression: 


 UTI (urinary tract infection), Lower gastrointestinal hemorrhage





Disposition: ADMITTED AS IP TO THIS HOSP


Is patient prescribed a controlled substance at d/c from ED?: No


Referrals: 


Kobe Sidhu MD [Primary Care Provider] - 1-2 days


Decision Time: 08:51

## 2020-08-09 NOTE — P.HPIM
History of Present Illness


H&P Date: 08/09/20


Chief Complaint: Lower GI bleed





History of presenting complaint:


He pleasant 86 years old female with past medical history of dementia, diabetes 

mellitus, hypertension.  Lives with 2 sons.  Sent in for having blood mixed with

stool.  Patient is pleasantly demented.  Does not know better


She is here..  Does not seem to have any abdominal pain.  No fever no chills 

reported.  History extremely limited.





Review of systems:


Difficult to obtain as patient not really able to answer questions.  May speak 

only occasional words.





Past medical history to include:


Dementia, diabetes, hypertension, GERD, ulcerative colitis





Social history:


Lives with 2 sons.  Unable to state the patient has history of alcohol or 

smoking.





Family history:


Patient cannot tell





Physical examination:


VITAL SIGNS: 97.7, 67, 18, 152/76, 98% room air


GENERAL: BMI 23.5, laying in bed, awake comfortable.


EYES: Pupils equal.  Conjunctiva normal.


HEENT: External appearance of nose and ears normal, oral cavity grossly normal.


NECK: JVD not raised; masses not palpable.


HEART: First and second heart sounds are normal;  no edema.  


LUNGS: Respiratory rate normal; clear to auscultation.  


ABDOMEN: Soft,  nontender, liver spleen not palpable, no masses palpable.  


PSYCH: [Patient can't name.  Does not know that she is otherwise she is here.  

Does not bother month.  l.  


NEUROLOGICAL: Cranial nerves grossly intact; no facial asymmetry,   power and 

sensation grossly intact. 


LYMPHATICS: No lymph nodes palpable in the axilla and neck





INVESTIGATIONS, reviewed in the clinical context:


White count 8.6 hemoglobin 11.2 platelets 484 potassium 5.1 bun 26 creatinine 

1.39





Previous testing:


From 05/05/2020:


Bun 17 creatinine 0.7 to-hemoglobin 12.2





Assessment:


-Acute lower GI bleed with stool mixed with blood.  Abdomen is nontender.  Could

 be diverticular bleed.  Expected to settle down and hopefully on its own.


-Possible acute kidney injury creatinine has gone from 0.7-1.39 last 3 months


-Acute metabolic encephalopathy from above


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


-Diabetes mellitus type 2


-Essential hypertension


-Acute UTI with cystitis








Plan:


Patient put on IV fluids.  Initially put on clear liquids.  Gentle hydration.  

Switch to PPI by mouth.  GI consulted.  On IV ceftriaxone.  Given age prognosis 

guarded.








Past Medical History


Past Medical History: Dementia, Diabetes Mellitus, GERD/Reflux, Hyperlipidemia, 

Hypertension


Additional Past Medical History / Comment(s): uti-ecoli, IBS, ulcerative colitis


History of Any Multi-Drug Resistant Organisms: ESBL


Date of last positivie culture/infection: 10/8/17


MDRO Source:: URINE ESBL


Past Surgical History: Cholecystectomy


Past Anesthesia/Blood Transfusion Reactions: No Reported Reaction


Past Psychological History: No Psychological Hx Reported


Smoking Status: Unknown if ever smoked


Past Alcohol Use History: None Reported


Past Drug Use History: None Reported





- Past Family History


  ** Father


History Unknown: Yes





  ** Mother


History Unknown: Yes





Medications and Allergies


                                Home Medications











 Medication  Instructions  Recorded  Confirmed  Type


 


Donepezil [Aricept] 10 mg PO HS@2100 10/08/17 08/09/20 History


 


Losartan Potassium 75 mg PO DAILY@0900 10/08/17 08/09/20 History


 


amLODIPine [Norvasc] 10 mg PO DAILY@0900 10/08/17 08/09/20 History


 


Magnesium Oxide [Mag-Ox] 400 mg PO BID@0900,2100 03/21/18 08/09/20 History


 


Memantine [Namenda] 5 mg PO BID@0900,2100 03/21/18 08/09/20 History


 


Pantoprazole [Protonix] 40 mg PO HS@2100 03/21/18 08/09/20 History


 


Acetaminophen [Tylenol] 650 mg PO Q6H PRN 05/12/20 08/09/20 History


 


Dicyclomine [Bentyl] 20 mg PO QID 05/12/20 08/09/20 History


 


Liraglutide [Victoza 3-Yayo] 1.2 mg SQ HS@2100 05/12/20 08/09/20 History


 


Metoprolol Tartrate [Lopressor] 50 mg PO BID@0900,2100 05/12/20 08/09/20 History


 


Clotrimazole/Betamethasone Dip 1 applic TOPICAL Q12H 08/09/20 08/09/20 History





[Lotrisone Cream]    


 


Ensure Clear 1 can PO TID@0900,1400,2000 08/09/20 08/09/20 History


 


Ellie Lotion 1 applic TOPICAL Q6H PRN 08/09/20 08/09/20 History


 


metFORMIN HCL ER [Glucophage Xr] 500 mg PO BID@0900,1700 08/09/20 08/09/20 

History








                                    Allergies











Allergy/AdvReac Type Severity Reaction Status Date / Time


 


No Known Allergies Allergy   Verified 08/09/20 08:27














Physical Exam


Vitals: 


                                   Vital Signs











  Temp Pulse Resp BP Pulse Ox


 


 08/09/20 09:07   78  16  123/62  97


 


 08/09/20 07:41  97.7 F  67  18  152/76  98








                                Intake and Output











 08/08/20 08/09/20 08/09/20





 22:59 06:59 14:59


 


Other:   


 


  Weight   60.101 kg














Results


CBC & Chem 7: 


                                 08/09/20 08:09





                                 08/09/20 08:09


Labs: 


                  Abnormal Lab Results - Last 24 Hours (Table)











  08/09/20 08/09/20 08/09/20 Range/Units





  08:09 08:09 08:09 


 


Hgb  11.2 L    (11.4-16.0)  gm/dL


 


RDW  16.3 H    (11.5-15.5)  %


 


Plt Count  484 H    (150-450)  k/uL


 


Chloride   108 H   ()  mmol/L


 


Carbon Dioxide   19 L   (22-30)  mmol/L


 


BUN   26 H   (7-17)  mg/dL


 


Creatinine   1.39 H   (0.52-1.04)  mg/dL


 


Glucose   131 H   (74-99)  mg/dL


 


Urine Appearance     (Clear)  


 


Urine Protein     (Negative)  


 


Urine Blood     (Negative)  


 


Urine Nitrite     (Negative)  


 


Ur Leukocyte Esterase     (Negative)  


 


Urine WBC     (0-5)  /hpf


 


Urine WBC Clumps     (None)  /hpf


 


Urine Bacteria     (None)  /hpf


 


Urine Mucus     (None)  /hpf


 


Stool Occult Blood    Positive H  (Negative)  














  08/09/20 Range/Units





  08:09 


 


Hgb   (11.4-16.0)  gm/dL


 


RDW   (11.5-15.5)  %


 


Plt Count   (150-450)  k/uL


 


Chloride   ()  mmol/L


 


Carbon Dioxide   (22-30)  mmol/L


 


BUN   (7-17)  mg/dL


 


Creatinine   (0.52-1.04)  mg/dL


 


Glucose   (74-99)  mg/dL


 


Urine Appearance  Cloudy H  (Clear)  


 


Urine Protein  1+ H  (Negative)  


 


Urine Blood  Trace H  (Negative)  


 


Urine Nitrite  Positive H  (Negative)  


 


Ur Leukocyte Esterase  Large H  (Negative)  


 


Urine WBC  147 H  (0-5)  /hpf


 


Urine WBC Clumps  Few H  (None)  /hpf


 


Urine Bacteria  Many H  (None)  /hpf


 


Urine Mucus  Rare H  (None)  /hpf


 


Stool Occult Blood   (Negative)

## 2020-08-10 NOTE — P.CONS
History of Present Illness





- Reason for Consult


Consult date: 08/09/20


GI bleed


Requesting physician: Osmani Salgado





- Chief Complaint


Blood per rectum





- History of Present Illness








86-year-old female with a medical history significant for diabetes mellitus, 

hypertension and dementia who presented to the hospital due to concern over 

blood per rectum.  Of note given the patient's underlying mental status history 

is been taken in conversation with the patient, on review of the medical record 

and in conversation with the medical team.  Apparently the patient had a 

witnessed episode of some bright red blood per rectum with bowel movement.  No 

further episodes of bleeding since admission to the hospital.  She does report 

that she has had multiple colonoscopies in the past.  She is denying any 

abdominal pain at this time.  Questionable history of ulcerative colitis in the 

past the patient is unable to provide any information regarding this.








Review of Systems


ROS unobtainable: due to mental status





Past Medical History


Past Medical History: Dementia, Diabetes Mellitus, GERD/Reflux, Hyperlipidemia, 

Hypertension


Additional Past Medical History / Comment(s): uti-ecoli, IBS, ulcerative colitis


History of Any Multi-Drug Resistant Organisms: ESBL


Year Discovered:: 10/8/17


MDRO Source:: URINE ESBL


Past Surgical History: Cholecystectomy


Past Anesthesia/Blood Transfusion Reactions: No Reported Reaction


Past Psychological History: No Psychological Hx Reported


Smoking Status: Unknown if ever smoked


Past Alcohol Use History: None Reported


Past Drug Use History: None Reported





- Past Family History


  ** Father


History Unknown: Yes





  ** Mother


History Unknown: Yes





Medications and Allergies


                                Home Medications











 Medication  Instructions  Recorded  Confirmed  Type


 


Donepezil [Aricept] 10 mg PO HS@2100 10/08/17 08/09/20 History


 


Losartan Potassium 75 mg PO DAILY@0900 10/08/17 08/09/20 History


 


amLODIPine [Norvasc] 10 mg PO DAILY@0900 10/08/17 08/09/20 History


 


Magnesium Oxide [Mag-Ox] 400 mg PO BID@0900,2100 03/21/18 08/09/20 History


 


Memantine [Namenda] 5 mg PO BID@0900,2100 03/21/18 08/09/20 History


 


Pantoprazole [Protonix] 40 mg PO HS@2100 03/21/18 08/09/20 History


 


Acetaminophen [Tylenol] 650 mg PO Q6H PRN 05/12/20 08/09/20 History


 


Dicyclomine [Bentyl] 20 mg PO QID 05/12/20 08/09/20 History


 


Liraglutide [Victoza 3-Yayo] 1.2 mg SQ HS@2100 05/12/20 08/09/20 History


 


Metoprolol Tartrate [Lopressor] 50 mg PO BID@0900,2100 05/12/20 08/09/20 History


 


Clotrimazole/Betamethasone Dip 1 applic TOPICAL Q12H 08/09/20 08/09/20 History





[Lotrisone Cream]    


 


Ensure Clear 1 can PO TID@0900,1400,2000 08/09/20 08/09/20 History


 


Ellie Lotion 1 applic TOPICAL Q6H PRN 08/09/20 08/09/20 History


 


metFORMIN HCL ER [Glucophage Xr] 500 mg PO BID@0900,1700 08/09/20 08/09/20 Histo

ry








                                    Allergies











Allergy/AdvReac Type Severity Reaction Status Date / Time


 


No Known Allergies Allergy   Verified 08/09/20 08:27














Physical Exam


Vitals: 


                                   Vital Signs











  Temp Pulse Resp BP Pulse Ox


 


 08/09/20 09:07   78  16  123/62  97


 


 08/09/20 07:41  97.7 F  67  18  152/76  98








                                Intake and Output











 08/08/20 08/09/20 08/09/20





 22:59 06:59 14:59


 


Other:   


 


  Weight   60.101 kg














On physical examination, patient appears comfortable in no apparent distress. 


HEAD: Normocephalic, atraumatic. 


EYES: No scleral icterus. No conjunctival injection. 


MOUTH: No lesions, tongue midline. 


NECK: Trachea midline, no gross abnormalities. 


CHEST: Decreased air and all fields. 


HEART: S1-S2 appreciated. 


ABDOMEN: Soft, nontender difficulty. Bowel sounds are positive. No organomegaly.

 No guarding or rigidity.


EXTREMITIES: No pedal edema. 


SKIN: No rashes, no jaundice. 


NEUROLOGIC: Alert and oriented to person.  No focal deficits. 





Results


CBC & Chem 7: 


                                 08/10/20 08:05





                                 08/10/20 08:05


Labs: 


                  Abnormal Lab Results - Last 24 Hours (Table)











  08/09/20 08/09/20 08/09/20 Range/Units





  08:09 08:09 08:09 


 


Hgb  11.2 L    (11.4-16.0)  gm/dL


 


RDW  16.3 H    (11.5-15.5)  %


 


Plt Count  484 H    (150-450)  k/uL


 


Chloride   108 H   ()  mmol/L


 


Carbon Dioxide   19 L   (22-30)  mmol/L


 


BUN   26 H   (7-17)  mg/dL


 


Creatinine   1.39 H   (0.52-1.04)  mg/dL


 


Glucose   131 H   (74-99)  mg/dL


 


Urine Appearance     (Clear)  


 


Urine Protein     (Negative)  


 


Urine Blood     (Negative)  


 


Urine Nitrite     (Negative)  


 


Ur Leukocyte Esterase     (Negative)  


 


Urine WBC     (0-5)  /hpf


 


Urine WBC Clumps     (None)  /hpf


 


Urine Bacteria     (None)  /hpf


 


Urine Mucus     (None)  /hpf


 


Stool Occult Blood    Positive H  (Negative)  














  08/09/20 Range/Units





  08:09 


 


Hgb   (11.4-16.0)  gm/dL


 


RDW   (11.5-15.5)  %


 


Plt Count   (150-450)  k/uL


 


Chloride   ()  mmol/L


 


Carbon Dioxide   (22-30)  mmol/L


 


BUN   (7-17)  mg/dL


 


Creatinine   (0.52-1.04)  mg/dL


 


Glucose   (74-99)  mg/dL


 


Urine Appearance  Cloudy H  (Clear)  


 


Urine Protein  1+ H  (Negative)  


 


Urine Blood  Trace H  (Negative)  


 


Urine Nitrite  Positive H  (Negative)  


 


Ur Leukocyte Esterase  Large H  (Negative)  


 


Urine WBC  147 H  (0-5)  /hpf


 


Urine WBC Clumps  Few H  (None)  /hpf


 


Urine Bacteria  Many H  (None)  /hpf


 


Urine Mucus  Rare H  (None)  /hpf


 


Stool Occult Blood   (Negative)  














Assessment and Plan


(1) Lower gastrointestinal hemorrhage


Narrative/Plan: 


86-year-old female presents for evaluation of blood per rectum.  No further 

episodes since presentation.  Stable..  Patient is demented at baseline and 5 

limited history.  She does report colonoscopy etiology may be AVM, diverticular 

bleed, stercoral ulcer or other etiology.


Current Visit: Yes   Status: Acute   Code(s): K92.2 - GASTROINTESTINAL 

HEMORRHAGE, UNSPECIFIED   SNOMED Code(s): 77603697


   


Plan: 





Supportive care


Okay for liquid diet


Continue to monitor hemoglobin and hematocrit and transfuse as needed


Continue to monitor stool output


Given age and multiple medical comorbidities we'll continue with conservative 

medical management, if precipitous fall in hemoglobin occurs or further signs or

symptoms of GI bleeding will consider endoscopic evaluation


Thank you for allowing us to participate in the care of the patient

## 2020-08-11 NOTE — P.PN
Subjective


Progress Note Date: 08/10/20


Principal diagnosis: 





Acute lower GI bleed





He pleasant 86 years old female with past medical history of dementia, diabetes 

mellitus, hypertension.  Lives with 2 sons.  Sent in for having blood mixed with

stool.  Patient is pleasantly demented.  Does not know better


She is here..  Does not seem to have any abdominal pain.  No fever no chills 

reported.  History extremely limited.





8/10/2020


Patient is currently lying in the bed comfortably.  No further episodes of ble

eding since admission to the hospital.


Denied any complaints of abdominal pain.  Patient is a poor historian otherwise.

 Patient has been afebrile.  No chest pain or shortness of breath.  Appears to b

e comfortable in bed.





Hemoglobin is 10.9 today.  Was 10.7 yesterday.  GI has seen the patient recom

mended to continue supportive care and monitor H&H.  Continue with conservative 

measures at this time.








Review of systems:


Difficult to obtain as patient not really able to answer questions.  May speak 

only occasional words.








Active Medications





Acetaminophen (Tylenol Tab)  650 mg PO Q6H PRN


   PRN Reason: Pain or Fever > 100.5


Amlodipine Besylate (Norvasc)  10 mg PO DAILY@0900 [facility]


   Last Admin: 08/10/20 07:46 Dose:  10 mg


   Documented by: 


Betamethasone/Clotrimazole (Lotrisone)  1 applic TOPICAL Q12H [facility]


   Last Admin: 08/10/20 17:40 Dose:  1 applic


   Documented by: 


Dicyclomine HCl (Bentyl)  20 mg PO QID [facility]


   Last Admin: 08/10/20 21:10 Dose:  20 mg


   Documented by: 


Donepezil HCl (Aricept)  10 mg PO HS@2100 [facility]


   Last Admin: 08/10/20 21:10 Dose:  10 mg


   Documented by: 


Sodium Chloride (Saline 0.9%)  1,000 mls @ 75 mls/hr IV .[id number] [facility]


   Last Admin: 08/10/20 07:47 Dose:  75 mls/hr


   Documented by: 


Ceftriaxone Sodium 1 gm/ (Sodium Chloride)  50 mls @ 100 mls/hr IVPB Q24HR [facility]


   Last Admin: 08/10/20 07:45 Dose:  100 mls/hr


   Documented by: 


Losartan Potassium (Cozaar)  75 mg PO DAILY@0900 [facility]


   Last Admin: 08/10/20 07:45 Dose:  75 mg


   Documented by: 


Magnesium Oxide (Mag-Ox)  400 mg PO BID@0900,2100 [facility]


   Last Admin: 08/10/20 21:10 Dose:  400 mg


   Documented by: 


Memantine (Namenda)  5 mg PO BID@0900,2100 [facility]


   Last Admin: 08/10/20 21:10 Dose:  5 mg


   Documented by: 


Metformin HCl (Glucophage)  500 mg PO BID@0900,1700 [facility]


   Last Admin: 08/10/20 17:39 Dose:  500 mg


   Documented by: 


Metoprolol Tartrate (Lopressor)  50 mg PO BID@0900,2100 [facility]


   Last Admin: 08/10/20 21:10 Dose:  50 mg


   Documented by: 


Naloxone HCl (Narcan)  0.2 mg IV Q2M PRN


   PRN Reason: Opioid Reversal


Patient's Own ( Liraglutide [Victoza 3-Yayo] 1.2 Mg)  1.2 mg SQ HS@2100 [facility]


   Last Admin: 08/10/20 21:11 Dose:  Not Given


   Documented by: 


Pantoprazole Sodium (Protonix)  40 mg PO AC-BID [facility]


   Last Admin: 08/10/20 17:39 Dose:  40 mg


   Documented by: 














Objective





- Vital Signs


Vital signs: 


                                   Vital Signs











Temp  98.3 F   08/10/20 20:41


 


Pulse  69   08/10/20 20:41


 


Resp  20   08/10/20 20:41


 


BP  151/66   08/10/20 20:41


 


Pulse Ox  98   08/10/20 20:41








                                 Intake & Output











 08/10/20 08/10/20 08/11/20





 06:59 18:59 06:59


 


Intake Total 1065  300


 


Balance 1065  300


 


Intake:   


 


  Intake, IV Titration 825  300





  Amount   


 


    Sodium Chloride 0.9% 1, 825  300





    000 ml @ 75 mls/hr IV .   





    [id number] [facility] Rx#:[id number]   


 


  Oral 240  


 


Other:   


 


  Voiding Method Incontinent Diaper Diaper





  Incontinent Incontinent


 


  # Voids 1 1 


 


  # Bowel Movements  1 














- Exam





GENERAL: BMI 23.5, laying in bed, awake comfortable.


EYES: Pupils equal.  Conjunctiva normal.


HEENT: External appearance of nose and ears normal, oral cavity grossly normal.


NECK: JVD not raised; masses not palpable.


HEART: First and second heart sounds are normal;  no edema.  


LUNGS: Respiratory rate normal; clear to auscultation.  


ABDOMEN: Soft,  nontender, liver spleen not palpable, no masses palpable.  


PSYCH: [Patient can't name.  Does not know that she is otherwise she is here.  

Does not bother month.  l.  


NEUROLOGICAL: Cranial nerves grossly intact; no facial asymmetry,   power and 

sensation grossly intact. 


LYMPHATICS: No lymph nodes palpable in the axilla and neck





- Labs


CBC & Chem 7: 


                                 08/10/20 08:05





                                 08/10/20 08:05


Labs: 


                  Abnormal Lab Results - Last 24 Hours (Table)











  08/10/20 08/10/20 08/10/20 Range/Units





  07:09 08:05 08:05 


 


Hgb   10.9 L   (11.4-16.0)  gm/dL


 


RDW   16.3 H   (11.5-15.5)  %


 


Chloride    110 H  ()  mmol/L


 


BUN    20 H  (7-17)  mg/dL


 


Creatinine    1.14 H  (0.52-1.04)  mg/dL


 


Glucose    127 H  (74-99)  mg/dL


 


POC Glucose (mg/dL)  130 H    (75-99)  mg/dL














  08/10/20 08/10/20 Range/Units





  17:10 20:04 


 


Hgb    (11.4-16.0)  gm/dL


 


RDW    (11.5-15.5)  %


 


Chloride    ()  mmol/L


 


BUN    (7-17)  mg/dL


 


Creatinine    (0.52-1.04)  mg/dL


 


Glucose    (74-99)  mg/dL


 


POC Glucose (mg/dL)  133 H  163 H  (75-99)  mg/dL








                      Microbiology - Last 24 Hours (Table)











 08/09/20 08:09 Urine Culture - Preliminary





 Urine,Voided    Gram Neg Bacilli


 


 08/09/20 09:33 Blood Culture - Preliminary





 Blood    No Growth after 24 hours














Assessment and Plan


Assessment: 





INVESTIGATIONS, reviewed in the clinical context:


White count 8.6 hemoglobin 11.2 platelets 484 potassium 5.1 bun 26 creatinine 

1.39





Previous testing:


From 05/05/2020:


Bun 17 creatinine 0.7 to-hemoglobin 12.2





Assessment:


-Acute lower GI bleed with stool mixed with blood.  Abdomen is nontender.  Could

 be diverticular bleed.  Expected to settle down and hopefully on its own.


-Possible acute kidney injury creatinine has gone from 0.7-1.39 last 3 months


-Acute metabolic encephalopathy from above


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


-Diabetes mellitus type 2


-Essential hypertension


-Acute UTI with cystitis








Plan:


Patient put on IV fluids.  Initially put on clear liquids.  Gentle hydration.  

Switch to PPI by mouth.  GI  is following.Continue with conservative management.

  Follow-up urine culture reports..  On IV ceftriaxone.  Given age prognosis 

guarded.








Time with Patient: Greater than 30

## 2020-08-11 NOTE — P.PN
Subjective


Progress Note Date: 08/10/20





Objective





- Vital Signs


Vital signs: 


                                   Vital Signs











Temp  97.3 F L  08/10/20 11:20


 


Pulse  55 L  08/10/20 11:20


 


Resp  18   08/10/20 11:20


 


BP  141/68   08/10/20 11:20


 


Pulse Ox  99   08/10/20 11:20








                                 Intake & Output











 08/09/20 08/10/20 08/10/20





 18:59 06:59 18:59


 


Intake Total  1065 


 


Balance  1065 


 


Weight 60.101 kg  


 


Intake:   


 


  Intake, IV Titration  825 





  Amount   


 


    Sodium Chloride 0.9% 1,  825 





    000 ml @ 75 mls/hr IV .   





    [id number] [facility] Rx#:[id number]   


 


  Oral  240 


 


Other:   


 


  Voiding Method  Incontinent Incontinent


 


  # Voids 2 1 1














- Labs


CBC & Chem 7: 


                                 08/11/20 07:36





                                 08/10/20 08:05


Labs: 


                  Abnormal Lab Results - Last 24 Hours (Table)











  08/09/20 08/09/20 08/10/20 Range/Units





  17:19 20:30 07:09 


 


Hgb  10.7 L    (11.4-16.0)  gm/dL


 


Hct  33.9 L    (34.0-46.0)  %


 


RDW  16.4 H    (11.5-15.5)  %


 


Plt Count  463 H    (150-450)  k/uL


 


Chloride     ()  mmol/L


 


BUN     (7-17)  mg/dL


 


Creatinine     (0.52-1.04)  mg/dL


 


Glucose     (74-99)  mg/dL


 


POC Glucose (mg/dL)   157 H  130 H  (75-99)  mg/dL














  08/10/20 08/10/20 Range/Units





  08:05 08:05 


 


Hgb  10.9 L   (11.4-16.0)  gm/dL


 


Hct    (34.0-46.0)  %


 


RDW  16.3 H   (11.5-15.5)  %


 


Plt Count    (150-450)  k/uL


 


Chloride   110 H  ()  mmol/L


 


BUN   20 H  (7-17)  mg/dL


 


Creatinine   1.14 H  (0.52-1.04)  mg/dL


 


Glucose   127 H  (74-99)  mg/dL


 


POC Glucose (mg/dL)    (75-99)  mg/dL








                      Microbiology - Last 24 Hours (Table)











 08/09/20 09:33 Blood Culture - Preliminary





 Blood    No Growth after 24 hours


 


 08/09/20 08:09 Urine Culture - Preliminary





 Urine,Voided 














Assessment and Plan


(1) Lower gastrointestinal hemorrhage


Narrative/Plan: 


86-year-old female presents for evaluation of blood per rectum.  No further 

episodes since presentation.  Stable hemoglobin.  Patient is demented at 

baseline and 5 limited history.  She does report colonoscopy etiology may be 

AVM, diverticular bleed, stercoral ulcer or other etiology.


Current Visit: Yes   Status: Acute   Code(s): K92.2 - GASTROINTESTINAL 

HEMORRHAGE, UNSPECIFIED   SNOMED Code(s): 06182865


   


Plan: 





Supportive care


Okay to advance diet


Continue to monitor hemoglobin and hematocrit and transfuse as needed


Continue to monitor stool output


Given age and multiple medical comorbidities we'll continue with conservative 

medical management, if precipitous fall in hemoglobin occurs or further signs or

symptoms of GI bleeding will consider endoscopic evaluation


Thank you for allowing us to participate in the care of the patient

## 2020-08-12 NOTE — P.PN
Subjective


Progress Note Date: 08/12/20


Principal diagnosis: 





Acute lower GI bleed





This is a pleasant 86 years old female with past medical history of dementia, 

diabetes mellitus, hypertension.  Lives with 2 sons.  Sent in for having blood 

mixed with stool.  Patient is pleasantly demented.  Does not know better


She is here..  Does not seem to have any abdominal pain.  No fever no chills 

reported.  History extremely limited.





8/10/2020


Patient is currently lying in the bed comfortably.  No further episodes of 

bleeding since admission to the hospital.


Denied any complaints of abdominal pain.  Patient is a poor historian otherwise.

 Patient has been afebrile.  No chest pain or shortness of breath.  Appears to 

be comfortable in bed.





Hemoglobin is 10.9 today.  Was 10.7 yesterday.  GI has seen the patient recommen

ded to continue supportive care and monitor H&H.  Continue with conservative 

measures at this time.





08/12/2020


Patient is seen and evaluated and follow-up and urine cultures finalized showing

E. coli with ESBL in the urine.  Infectious disease consulted and patient was 

initiated on meropenem although transitioned Invanz.  Patient continues to have 

some abdominal discomfort and did have a bowel movement today per nursing staff.

 Specimen was loose and odorous and sent down to test for C. diff and is 

currently pending.  Hemoglobin is stable at 10.8 today.  Will repeat a.m. labs. 

No reports of chest pain, palpitations, or shortness of breath.  Patient is 

afebrile.





Objective





- Vital Signs


Vital signs: 


                                   Vital Signs











Temp  97.8 F   08/12/20 11:23


 


Pulse  51 L  08/12/20 11:23


 


Resp  16   08/12/20 11:23


 


BP  154/68   08/12/20 11:23


 


Pulse Ox  99   08/12/20 11:23








                                 Intake & Output











 08/11/20 08/12/20 08/12/20





 18:59 06:59 18:59


 


Weight   60.101 kg


 


Other:   


 


  Voiding Method Diaper Diaper Diaper





 Incontinent Incontinent Incontinent


 


  # Voids 3 0 2


 


  # Bowel Movements  0 2














- Exam





GENERAL: BMI 23.5, laying in bed, awake comfortable.


EYES: Pupils equal.  Conjunctiva normal.


HEENT: External appearance of nose and ears normal, oral cavity grossly normal.


NECK: JVD not raised; masses not palpable.


HEART: First and second heart sounds are normal;  no edema.  


LUNGS: Respiratory rate normal; clear to auscultation.  


ABDOMEN: Soft,  mildly tender upon palpation, liver spleen not palpable, no 

masses palpable.  


PSYCH: Unable to fully assess, patient barely speaks


NEUROLOGICAL: Cranial nerves grossly intact; no facial asymmetry,  power and 

sensation grossly intact. 


LYMPHATICS: No lymph nodes palpable in the axilla and neck





- Labs


CBC & Chem 7: 


                                 08/12/20 08:01





                                 08/10/20 08:05


Labs: 


                  Abnormal Lab Results - Last 24 Hours (Table)











  08/11/20 08/12/20 08/12/20 Range/Units





  16:53 08:01 14:33 


 


Hgb   10.8 L   (11.4-16.0)  gm/dL


 


Hct   33.9 L   (34.0-46.0)  %


 


RDW   15.8 H   (11.5-15.5)  %


 


POC Glucose (mg/dL)  101 H    (75-99)  mg/dL


 


Urine Appearance    Turbid H  (Clear)  


 


Urine Protein    1+ H  (Negative)  


 


Urine Ketones    1+ H  (Negative)  


 


Urine Blood    Small H  (Negative)  


 


Ur Leukocyte Esterase    Large H  (Negative)  


 


Urine RBC    43 H  (0-5)  /hpf


 


Urine WBC    >182 H  (0-5)  /hpf


 


Urine WBC Clumps    Many H  (None)  /hpf


 


Urine Bacteria    Few H  (None)  /hpf


 


Urine Mucus    Rare H  (None)  /hpf








                      Microbiology - Last 24 Hours (Table)











 08/09/20 09:33 Blood Culture - Preliminary





 Blood    No Growth after 72 hours


 


 08/09/20 08:09 Urine Culture - Final





 Urine,Voided    Escherichia coli














Assessment and Plan


Assessment: 





-Acute lower GI bleed with stool mixed with blood.  Abdomen is nontender.  Could

be diverticular bleed.  Expected to settle down and hopefully on its own.


-Possible acute kidney injury creatinine has gone from 0.7-1.39 last 3 months


-Acute metabolic encephalopathy from above


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


-Diabetes mellitus type 2


-Essential hypertension


-Acute UTI with cystitis








Plan:


Continue current medications, management, and symptomatic treatment.  GI 

following as needed and recommending conservative medical management with no 

immediate endoscopic interventions scheduled.  Hemoglobin is stable at 10.8 

today.  Urine cultures finalized showing E. coli with ESBL and patient was 

initiated on meropenem.  Infectious disease consulted and transitioned 

antibiotics to Invanz.  Repeat urinalysis continues to show urinary tract 

infection and patient will possibly need IV antibiotic therapy at the [facility].  Fo

llow-up urine culture reports.  Due to multiple complex medical issues, 

prognosis is guarded.  Further recommendations to follow.  Consider possible 

discharge in 24-48 hours.  Case management and social work are following as 

[place] [facility] [place]

## 2020-08-12 NOTE — P.PN
Subjective


Progress Note Date: 08/11/20


Principal diagnosis: 





Acute lower GI bleed





He pleasant 86 years old female with past medical history of dementia, diabetes 

mellitus, hypertension.  Lives with 2 sons.  Sent in for having blood mixed with

stool.  Patient is pleasantly demented.  Does not know better


She is here..  Does not seem to have any abdominal pain.  No fever no chills 

reported.  History extremely limited.





8/10/2020


Patient is currently lying in the bed comfortably.  No further episodes of ble

eding since admission to the hospital.


Denied any complaints of abdominal pain.  Patient is a poor historian otherwise.

 Patient has been afebrile.  No chest pain or shortness of breath.  Appears to b

e comfortable in bed.





Hemoglobin is 10.9 today.  Was 10.7 yesterday.  GI has seen the patient recom

mended to continue supportive care and monitor H&H.  Continue with conservative 

measures at this time.





8/11/2020


Patient is currently lying in the bed comfortably.  Awake alert and able to 

communicate slowly.  No complaints of abdominal pain.  Tolerating oral diet 

slowly.  No fever no chills.  No dysuria or hematuria.  No further episodes of 

GI bleed.


Hemoglobin is 10.0 today.


Urine culture showed gram-negative bacilli.  Patient is being continued on 

ceftriaxone at this time.








Review of systems:


Difficult to obtain as patient not really able to answer questions.  May speak 

only occasional words.








Active Medications





Acetaminophen (Tylenol Tab)  650 mg PO Q6H PRN


   PRN Reason: Pain or Fever > 100.5


Amlodipine Besylate (Norvasc)  10 mg PO DAILY@0900 [facility]


   Last Admin: 08/10/20 07:46 Dose:  10 mg


   Documented by: 


Betamethasone/Clotrimazole (Lotrisone)  1 applic TOPICAL Q12H [facility]


   Last Admin: 08/10/20 17:40 Dose:  1 applic


   Documented by: 


Dicyclomine HCl (Bentyl)  20 mg PO QID [facility]


   Last Admin: 08/10/20 21:10 Dose:  20 mg


   Documented by: 


Donepezil HCl (Aricept)  10 mg PO HS@2100 [facility]


   Last Admin: 08/10/20 21:10 Dose:  10 mg


   Documented by: 


Sodium Chloride (Saline 0.9%)  1,000 mls @ 75 mls/hr IV .[id number] [facility]


   Last Admin: 08/10/20 07:47 Dose:  75 mls/hr


   Documented by: 


Ceftriaxone Sodium 1 gm/ (Sodium Chloride)  50 mls @ 100 mls/hr IVPB Q24HR [facility]


   Last Admin: 08/10/20 07:45 Dose:  100 mls/hr


   Documented by: 


Losartan Potassium (Cozaar)  75 mg PO DAILY@0900 [facility]


   Last Admin: 08/10/20 07:45 Dose:  75 mg


   Documented by: 


Magnesium Oxide (Mag-Ox)  400 mg PO BID@0900,2100 [facility]


   Last Admin: 08/10/20 21:10 Dose:  400 mg


   Documented by: 


Memantine (Namenda)  5 mg PO BID@0900,2100 [facility]


   Last Admin: 08/10/20 21:10 Dose:  5 mg


   Documented by: 


Metformin HCl (Glucophage)  500 mg PO BID@0900,1700 [facility]


   Last Admin: 08/10/20 17:39 Dose:  500 mg


   Documented by: 


Metoprolol Tartrate (Lopressor)  50 mg PO BID@0900,2100 [facility]


   Last Admin: 08/10/20 21:10 Dose:  50 mg


   Documented by: 


Naloxone HCl (Narcan)  0.2 mg IV Q2M PRN


   PRN Reason: Opioid Reversal


Patient's Own ( Liraglutide [Victoza 3-Yayo] 1.2 Mg)  1.2 mg SQ HS@2100 [facility]


   Last Admin: 08/10/20 21:11 Dose:  Not Given


   Documented by: 


Pantoprazole Sodium (Protonix)  40 mg PO AC-BID [facility]


   Last Admin: 08/10/20 17:39 Dose:  40 mg


   Documented by: 














Objective





- Vital Signs


Vital signs: 


                                   Vital Signs











Temp  97.6 F   08/12/20 04:40


 


Pulse  56 L  08/12/20 04:40


 


Resp  16   08/12/20 04:40


 


BP  117/48   08/12/20 04:40


 


Pulse Ox  98   08/12/20 04:40








                                 Intake & Output











 08/11/20 08/12/20 08/12/20





 18:59 06:59 18:59


 


Weight   60.101 kg


 


Other:   


 


  Voiding Method Diaper Diaper Diaper





 Incontinent Incontinent Incontinent


 


  # Voids 3 0 


 


  # Bowel Movements  0 














- Exam





GENERAL: BMI 23.5, laying in bed, awake comfortable.


EYES: Pupils equal.  Conjunctiva normal.


HEENT: External appearance of nose and ears normal, oral cavity grossly normal.


NECK: JVD not raised; masses not palpable.


HEART: First and second heart sounds are normal;  no edema.  


LUNGS: Respiratory rate normal; clear to auscultation.  


ABDOMEN: Soft,  nontender, liver spleen not palpable, no masses palpable.  


PSYCH: [Patient can't name.  Does not know that she is otherwise she is here.  

Does not bother month.  l.  


NEUROLOGICAL: Cranial nerves grossly intact; no facial asymmetry,   power and 

sensation grossly intact. 


LYMPHATICS: No lymph nodes palpable in the axilla and neck





- Labs


CBC & Chem 7: 


                                 08/12/20 08:01





                                 08/10/20 08:05


Labs: 


                  Abnormal Lab Results - Last 24 Hours (Table)











  08/11/20 08/11/20 08/12/20 Range/Units





  11:04 16:53 08:01 


 


Hgb    10.8 L  (11.4-16.0)  gm/dL


 


Hct    33.9 L  (34.0-46.0)  %


 


RDW    15.8 H  (11.5-15.5)  %


 


POC Glucose (mg/dL)  135 H  101 H   (75-99)  mg/dL








                      Microbiology - Last 24 Hours (Table)











 08/09/20 08:09 Urine Culture - Final





 Urine,Voided    Escherichia coli


 


 08/09/20 09:33 Blood Culture - Preliminary





 Blood    No Growth after 48 hours














Assessment and Plan


Assessment: 





INVESTIGATIONS, reviewed in the clinical context:


White count 8.6 hemoglobin 11.2 platelets 484 potassium 5.1 bun 26 creatinine 

1.39





Previous testing:


From 05/05/2020:


Bun 17 creatinine 0.7 to-hemoglobin 12.2





Assessment:


-Acute lower GI bleed with stool mixed with blood.  Abdomen is nontender.  Could

 be diverticular bleed.  Expected to settle down and hopefully on its own.


- acute kidney injury creatinine has gone from 0.7-1.39 last 3 months


-Acute metabolic encephalopathy from above


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


-Diabetes mellitus type 2


-Essential hypertension


-Acute UTI with cystitis with gm-ve bacillli








Plan:


Patient put on IV fluids.  Initially put on clear liquids.  Gentle hydration.  

Switch to PPI by mouth.  GI  is following.Continue with conservative management.

  Follow-up urine culture reports..  On IV ceftriaxone.  Given age prognosis 

guarded.

## 2020-08-12 NOTE — P.CONS
History of Present Illness





- Reason for Consult


Consult date: 08/12/20


ESBL E. coli urinary tract infection


Requesting physician: Wali Chopra





- Chief Complaint


Bleeding per rectum x one day





- History of Present Illness


Patient is 86-year-old  female nursing home resident patient has been 

sent to the ER   On 08/09/2020 after the patient was noticed to have some blood 

mixed in the stools patient subsequently has been evaluated by the physician and

the patient was admitted to the hospital for GI workup patient has been evaluate

d by GI services and no endoscopy was recommended patient also noticed to have a

positive UA with concern for possible UTI for the patient has been treated with 

the Rocephin + the patient urine culture came back positive for ESBL E. coli 

patient antibiotic was switched over to meropenem 1 g every 12 hours infectious 

and he was consented today for further management of antibiotic therapy patient 

has been afebrile during this hospital admission and did have a normal white 

count however the nursing staff the patient did have a very cloudy and foul-

smelling urine, and the patient seemed to be slightly more lethargic and 

confused since the patient has been admitted to the hospital patient herself was

unable to work and reliable history so most information has been obtained from 

review the chart and talking to the nursing staff





Review of Systems


Positive points has been mentioned in HPI complete review could not be obtained 

because of his underlying mental status








Past Medical History


Past Medical History: Dementia, Diabetes Mellitus, GERD/Reflux, Hyperlipidemia, 

Hypertension


Additional Past Medical History / Comment(s): uti-ecoli, IBS, ulcerative colitis


History of Any Multi-Drug Resistant Organisms: ESBL


Year Discovered:: 8/11/20


MDRO Source:: URINE ESBL


Past Surgical History: Cholecystectomy


Past Anesthesia/Blood Transfusion Reactions: No Reported Reaction


Past Psychological History: No Psychological Hx Reported


Smoking Status: Unknown if ever smoked


Past Alcohol Use History: None Reported


Past Drug Use History: None Reported





- Past Family History


  ** Father


History Unknown: Yes





  ** Mother


History Unknown: Yes





Medications and Allergies


                                Home Medications











 Medication  Instructions  Recorded  Confirmed  Type


 


Donepezil [Aricept] 10 mg PO HS@2100 10/08/17 08/09/20 History


 


Losartan Potassium 75 mg PO DAILY@0900 10/08/17 08/09/20 History


 


amLODIPine [Norvasc] 10 mg PO DAILY@0900 10/08/17 08/09/20 History


 


Magnesium Oxide [Mag-Ox] 400 mg PO BID@0900,2100 03/21/18 08/09/20 History


 


Memantine [Namenda] 5 mg PO BID@0900,2100 03/21/18 08/09/20 History


 


Pantoprazole [Protonix] 40 mg PO HS@2100 03/21/18 08/09/20 History


 


Acetaminophen [Tylenol] 650 mg PO Q6H PRN 05/12/20 08/09/20 History


 


Dicyclomine [Bentyl] 20 mg PO QID 05/12/20 08/09/20 History


 


Liraglutide [Victoza 3-Yayo] 1.2 mg SQ HS@2100 05/12/20 08/09/20 History


 


Metoprolol Tartrate [Lopressor] 50 mg PO BID@0900,2100 05/12/20 08/09/20 History


 


Clotrimazole/Betamethasone Dip 1 applic TOPICAL Q12H 08/09/20 08/09/20 History





[Lotrisone Cream]    


 


Ensure Clear 1 can PO TID@0900,1400,2000 08/09/20 08/09/20 History


 


Ellie Lotion 1 applic TOPICAL Q6H PRN 08/09/20 08/09/20 History


 


metFORMIN HCL ER [Glucophage Xr] 500 mg PO BID@0900,1700 08/09/20 08/09/20 

History








                                    Allergies











Allergy/AdvReac Type Severity Reaction Status Date / Time


 


No Known Allergies Allergy   Verified 08/09/20 08:27














Physical Exam


Vitals: 


                                   Vital Signs











  Temp Pulse Resp BP Pulse Ox


 


 08/12/20 11:23  97.8 F  51 L  16  154/68  99


 


 08/12/20 04:40  97.6 F  56 L  16  117/48  98


 


 08/11/20 20:49  99.2 F  60  16  138/77  97








                                Intake and Output











 08/11/20 08/12/20 08/12/20





 22:59 06:59 14:59


 


Other:   


 


  Voiding Method Diaper  Diaper





 Incontinent  Incontinent


 


  # Voids 0 0 


 


  # Bowel Movements  0 


 


  Weight   60.101 kg











GENERAL DESCRIPTION: An elderly female lying in bed, no distress. No tachypnea 

or accessory muscle of respiration use.


HEENT: Shows Pallor , no scleral icterus. Oral mucous membrane is dry. No 

pharyngeal erythema or thrush


NECK: Trachea central, no thyromegaly.


LUNGS: Unlabored breathing. Clear to auscultation anteriorly. No wheeze or 

crackle.


HEART: S1, S2, regular rate and rhythm. No loud murmur


ABDOMEN: Soft, no tenderness , guarding or rigidity, no organomegaly


EXTREMITIES: No edema of feet.


SKIN: No rash, no masses palpable.


NEUROLOGICAL: The patient is  sleepy lethargic orientation could not be 

determined

















Results


CBC & Chem 7: 


                                 08/12/20 08:01





                                 08/10/20 08:05


Labs: 


                  Abnormal Lab Results - Last 24 Hours (Table)











  08/11/20 08/12/20 Range/Units





  16:53 08:01 


 


Hgb   10.8 L  (11.4-16.0)  gm/dL


 


Hct   33.9 L  (34.0-46.0)  %


 


RDW   15.8 H  (11.5-15.5)  %


 


POC Glucose (mg/dL)  101 H   (75-99)  mg/dL








                      Microbiology - Last 24 Hours (Table)











 08/09/20 09:33 Blood Culture - Preliminary





 Blood    No Growth after 72 hours


 


 08/09/20 08:09 Urine Culture - Final





 Urine,Voided    Escherichia coli














Assessment and Plan


Assessment: 


1-patient noticed to have a positive UA in this patient who did have a foul-

smelling cloudy urine per the nursing staff has been cleaning the patient she 

seems slightly more lethargic and sleepy today with concern for symptomatic 

urinary tract infection.  Urine culture has been finalized as ESBL likely the 

infected pathogen





(1) Infection due to ESBL-producing Escherichia coli


Current Visit: Yes   Status: Acute   Code(s): A49.8 - OTHER BACTERIAL INFECTIONS

OF UNSPECIFIED SITE; Z16.12 - EXTENDED SPECTRUM BETA LACTAMASE (ESBL) RESISTANCE

  SNOMED Code(s): 676713448


   





(2) UTI (urinary tract infection)


Current Visit: Yes   Status: Acute   Code(s): N39.0 - URINARY TRACT INFECTION, 

SITE NOT SPECIFIED   SNOMED Code(s): 89328585


   


Plan: 


1-antibiotic adjusted to Invanz 1 g IVPB daily


2-we will obtain a repeat UA and culture straight cath for a clean-catch 

specimen.  If the repeat a UA is positive recommend obtaining an midline and 

plan 7 day course of IV Invanz which can be done at the nursing home, plan of 

care was discussed in detail with the RN taking care of the patient


We will follow on clinical condition and cultures to further adjust medication 

if needed


Thank you for this consultation will follow this patient with you





Time with Patient: Greater than 30

## 2020-08-13 NOTE — P.DS
Providers


Date of admission: 


08/09/20 08:51





Expected date of discharge: 08/13/20


Attending physician: 


Osmani Salgado





Consults: 





                                        





08/12/20 11:03


Consult Physician Routine 


   Consulting Provider: Hazel Wu


   Consult Reason/Comments: ESBL E coli


   Do you want consulting provider notified?: Yes











Primary care physician: 


Kobe [facility]





Hospital Course: 





Final diagnosis


-Acute lower GI bleed with stool mixed with blood, Possible diverticular bleed


-Possible acute kidney injury creatinine has gone from 0.7-1.39 last 3 months


-Acute metabolic encephalopathy from above


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


-Diabetes mellitus type 2


-Essential hypertension


-Acute UTI with cystitis, With E. coli and ESBL in the urine








Discharge disposition


Patient is being discharged in a stable condition with guarded prognosis to 

[facility].  Patient will follow-up with Dr. Guerrier upon 

discharge.  Patient will continue with a short course of IV antibiotics in the 

form of Invanz 1 g daily for the next 7 days  and then may discontinue. Patient 

did receive a midline prior to discharge.  Total time taken is 35 minutes.





History of present illness


This is an 85-year-old female who was recently admitted with Acute lower GI 

bleed as she was found to have blood in the stool and was being closely 

monitored. Patient was seen and evaluated by GI and monitored closely.  GI 

recommending no surgical intervention at this time.  Hemoglobin has been stable 

and is 10.0 today.  Urinalysis was done and patient's urine cultures finalized 

showing E. coli with ESBL in the urine.  Patient received a midline and was 

evaluated by infectious disease and will be continuing on IV Invanz 1 g daily 

for the next 7 days and then may discontinue. Patient does have a history of 

dementia and barely speaks.  C. diff testing was negative.  Patient was also 

tested for Covid 19 Which was negative. Currently no reports of chest pain, 

shortness of breath, or palpitations.  Patient is afebrile.  No reports of 

nausea or vomiting and patient is tolerating diet With assistance. Patient will 

be going to [facility] today.





On exam vital signs are stable.  Temp is 97.7F, pulse is 50, respirations are 

18, blood pressure is 136/54, oxygen saturation is 96% on room air.  Cardio S1, 

S2 are muffled.  Respiratory shows diminished breath sounds at the bases with a 

few scattered rhonchi noted.  Abdomen is soft and nontender.  Nervous system 

shows mild diffuse weakness.





Please refer to medication reconciliation sheet for a list of medications.





Patient Condition at Discharge: Stable





Plan - Discharge Summary


Discharge Rx Participant: No


New Discharge Prescriptions: 


New


   Ertapenem [INVanz] 1 gm IVPB HS 7 Days #7 vial


   Pantoprazole [Protonix] 40 mg PO AC-BID  tablet.dr





Continue


   amLODIPine [Norvasc] 10 mg PO DAILY@0900


   Donepezil [Aricept] 10 mg PO HS@2100


   Losartan Potassium 75 mg PO DAILY@0900


   Magnesium Oxide [Mag-Ox] 400 mg PO BID@0900,2100


   Memantine [Namenda] 5 mg PO BID@0900,2100


   Dicyclomine [Bentyl] 20 mg PO QID


   Liraglutide [Victoza 3-Yayo] 1.2 mg SQ HS@2100


   Metoprolol Tartrate [Lopressor] 50 mg PO BID@0900,2100


   Acetaminophen [Tylenol] 650 mg PO Q6H PRN


     PRN Reason: Pain Or Fever > 100.5


   Clotrimazole/Betamethasone Dip [Lotrisone Cream] 1 applic TOPICAL Q12H


   Ensure Clear 1 can PO TID@0900,1400,2000


   metFORMIN HCL ER [Glucophage Xr] 500 mg PO BID@0900,1700


   Ellie Lotion 1 applic TOPICAL Q6H PRN


     PRN Reason: PRURITIS





Discontinued


   Pantoprazole [Protonix] 40 mg PO HS@2100


Discharge Medication List





Donepezil [Aricept] 10 mg PO HS@2100 10/08/17 [History]


Losartan Potassium 75 mg PO DAILY@0900 10/08/17 [History]


amLODIPine [Norvasc] 10 mg PO DAILY@0900 10/08/17 [History]


Magnesium Oxide [Mag-Ox] 400 mg PO BID@0900,2100 03/21/18 [History]


Memantine [Namenda] 5 mg PO BID@0900,2100 03/21/18 [History]


Acetaminophen [Tylenol] 650 mg PO Q6H PRN 05/12/20 [History]


Dicyclomine [Bentyl] 20 mg PO QID 05/12/20 [History]


Liraglutide [Victoza 3-Yayo] 1.2 mg SQ HS@2100 05/12/20 [History]


Metoprolol Tartrate [Lopressor] 50 mg PO BID@0900,2100 05/12/20 [History]


Clotrimazole/Betamethasone Dip [Lotrisone Cream] 1 applic TOPICAL Q12H 08/09/20 

[History]


Ensure Clear 1 can PO TID@0900,1400,2000 08/09/20 [History]


Ellie Lotion 1 applic TOPICAL Q6H PRN 08/09/20 [History]


metFORMIN HCL ER [Glucophage Xr] 500 mg PO BID@0900,1700 08/09/20 [History]


Ertapenem [INVanz] 1 gm IVPB HS 7 Days #7 vial 08/13/20 [Rx]


Pantoprazole [Protonix] 40 mg PO AC-BID  tablet. 08/13/20 [Rx]








Follow up Appointment(s)/Referral(s): 


Kobe Sidhu MD [Primary Care Provider] - 1-2 days


Activity/Diet/Wound Care/Special Instructions: 


[place] [facility] [place]


Activity as tolerated


Continue with IV antibiotic therapy per infectious disease for 7 days and then 

may discontinue


Follow-up with primary care provider upon discharge


Follow-up with GI in the outpatient setting as needed





Discharge Disposition: TRANSFER TO SNF/ECF

## 2020-08-13 NOTE — PN
PROGRESS NOTE



DATE OF SERVICE:

08/13/2020



REASON FOR FOLLOWUP:

ESBL E coli urinary tract infection.



INTERVAL HISTORY:

The patient is currently afebrile.  The patient was seen on rounds this morning,

breathing comfortably, slightly more awake and alert.  No chest pain, no cough, no

vomiting and no diarrhea.



PHYSICAL EXAMINATION:

Blood pressure is 129/56, pulse of 55, temperature 98.2.  She is 99% on room air.

General description is an elderly female, lying in bed in no distress.

RESPIRATORY SYSTEM: Unlabored breathing, clear to auscultation anteriorly.

HEART:  S1, S2.  Regular rate and rhythm.

ABDOMEN:  Soft, no tenderness.



LABS:

Hemoglobin is 10.3, white count of 7.0, BUN of 11, creatinine 1.07.



DIAGNOSTIC IMPRESSION AND PLAN:

Patient has E. coli urinary tract infection, repeat was positive.  The patient is

currently on Invanz 1 g daily.  Continue to finish a course of therapy.  Continue

supportive care.





MMODL / IJN: [id number] / Job#: [id number]

## 2021-03-03 NOTE — ED
General Adult HPI





- General


Chief complaint: Recheck/Abnormal Lab/Rx


Stated complaint: Failure to thrive


Time Seen by Provider: 03/03/21 17:16


Source: patient


Mode of arrival: ambulatory


Limitations: no limitations





- History of Present Illness


Initial comments: 





Patient is an 87-year-old female, with history of dementia, nonverbal at 

baseline, diabetes hypertension, [place] [facility] [place].  According to EMS, patient has 

not been eating, she also has an ulcer on her buttocks that is not been healing 

and needs further management.  She has had no fevers.  Her appetite has been 

low.  Again, patient is nonverbal, no one else in the room to help with history.

 Patient's vitals are stable upon arrival, afebrile.





- Related Data


                                Home Medications











 Medication  Instructions  Recorded  Confirmed


 


Donepezil [Aricept] 10 mg PO HS@2100 10/08/17 03/03/21


 


Losartan Potassium 75 mg PO DAILY@0900 10/08/17 03/03/21


 


amLODIPine [Norvasc] 10 mg PO DAILY@0900 10/08/17 03/03/21


 


Magnesium Oxide [Mag-Ox] 400 mg PO BID@0900,2100 03/21/18 03/03/21


 


Memantine [Namenda] 5 mg PO BID@0900,2100 03/21/18 03/03/21


 


Acetaminophen [Tylenol] 650 mg PO Q6H PRN 05/12/20 03/03/21


 


Dicyclomine [Bentyl] 20 mg PO QID@09,13,17,21 05/12/20 03/03/21


 


Metoprolol Tartrate [Lopressor] 50 mg PO BID@0900,2100 05/12/20 03/03/21


 


Ensure Clear 1 can PO TID@0900,1400,2000 08/09/20 03/03/21


 


Ellie Lotion 1 applic TOPICAL Q6H PRN 08/09/20 03/03/21


 


Collagenase [Santyl] 1 applic TOPICAL HS 03/03/21 03/03/21


 


Famotidine [Pepcid] 20 mg PO HS@2100 03/03/21 03/03/21


 


Insulin Aspart [NovoLOG] See Protocol SQ TID@0800,1200,1700 03/03/21 03/03/21


 


Insulin Degludec [Tresiba 12 unit SQ HS@2100 03/03/21 03/03/21





Flextouch U-100]   


 


Losartan Potassium [Cozaar] 25 mg PO DAILY@0900 03/03/21 03/03/21


 


Pioglitazone [Actos] 30 mg PO DAILY@0900 03/03/21 03/03/21


 


Prostat Awc 30 ml PO BID@0900,1700 03/03/21 03/03/21











                                    Allergies











Allergy/AdvReac Type Severity Reaction Status Date / Time


 


No Known Allergies Allergy   Verified 03/03/21 17:45














Review of Systems


ROS Statement: 


Those systems with pertinent positive or pertinent negative responses have been 

documented in the HPI.





ROS Other: All systems not noted in ROS Statement are negative.





Past Medical History


Past Medical History: Dementia, Diabetes Mellitus, GERD/Reflux, Hyperlipidemia, 

Hypertension


Additional Past Medical History / Comment(s): uti-ecoli, IBS, ulcerative colitis


History of Any Multi-Drug Resistant Organisms: ESBL


Date of last positivie culture/infection: 11/2/20


MDRO Source:: URINE ESBL


Past Surgical History: Cholecystectomy


Past Anesthesia/Blood Transfusion Reactions: No Reported Reaction


Past Psychological History: No Psychological Hx Reported


Smoking Status: Unknown if ever smoked


Past Alcohol Use History: None Reported


Past Drug Use History: None Reported





- Past Family History


  ** Father


History Unknown: Yes





  ** Mother


History Unknown: Yes





General Exam





- General Exam Comments


Initial Comments: 





GENERAL: 


Patient appears cachectic, nontoxic, no apparent distress, she is nonverbal.





HEAD: 


Atraumatic, normocephalic.





EYES:


Pupils equal round and reactive to light, extraocular movements intact, sclera 

anicteric, conjunctiva are normal.  Eyelids were unremarkable.





ENT: 


TMs normal, nares patent, oropharynx clear without exudates.  Moist mucous 

membranes.





NECK: 


Normal range of motion, supple without lymphadenopathy or JVD.





LUNGS:


Unlabored respirations.  Breath sounds clear to auscultation bilaterally and 

equal.  No wheezes rales or rhonchi.





HEART:


Regular rate and rhythm without murmurs, rubs or gallops.





ABDOMEN: 


Soft, nontender, normoactive bowel sounds.  No guarding, no rebound.  No masses 

appreciated.





: Deferred 





MUSCULOSKELETAL: 


Normal extremities with adequate strength and normal range of motion, no pitting

 or edema.  No clubbing or cyanosis.





NEUROLOGICAL: 


Patient is nonverbal at baseline.





PSYCH:


Normal mood, normal affect.





SKIN:


 Warm, Dry, normal turgor.  Patient has large, stage IV decubitus ulcer on her 

buttock, approximately 10 cm x 8 cm, with strong odor, greenish discharge.


Limitations: no limitations





Course


                                   Vital Signs











  03/03/21 03/03/21





  17:12 19:20


 


Temperature 97.8 F 97.9 F


 


Pulse Rate 96 98


 


Respiratory 18 20





Rate  


 


Blood Pressure 118/62 113/56


 


O2 Sat by Pulse 100 100





Oximetry  














EKG Findings





- EKG Comments:


EKG Findings:: Sinus tach with PACs, otherwise normal ECG.  Similar to previous 

on 08/09/2020.  Ventricular rate 104, DE interval 124, .





Medical Decision Making





- Medical Decision Making





Patient is an 87-year-old female sent in from emergency in the legs secondary to

 large stage IV decubitus ulcer as well as "failure to thrive."  Her vital signs

 are stable upon arrival.  Patient does have a large, approximately 10 cm stage 

IV decubitus ulcer on her buttock's, strong odor and discharge.  Labs reveal 

white count of 18.6, potassium is 5.7, BUN is 128, creatinine is 2.74.  CRP is 

234, lactic acid is 1.2.  Urine does show lots of bacteria, WBC clumps.  Rapid 

Covid is not detected.  Patient will be admitted for wound care, TANI, UTI.  We 

will consult nephrology, infectious disease and wound care.  Patient started on 

antibiotics, fluids.  Patient accepted by [facility].  Case discussed with 

Dr. yancey.   





- Lab Data


Result diagrams: 


                                 03/03/21 18:08





                                 03/03/21 18:08


                                   Lab Results











  03/03/21 03/03/21 03/03/21 Range/Units





  18:08 18:08 18:08 


 


WBC  18.6 H    (3.8-10.6)  k/uL


 


RBC  4.03    (3.80-5.40)  m/uL


 


Hgb  10.8 L    (11.4-16.0)  gm/dL


 


Hct  33.1 L    (34.0-46.0)  %


 


MCV  82.1    (80.0-100.0)  fL


 


MCH  26.8    (25.0-35.0)  pg


 


MCHC  32.7    (31.0-37.0)  g/dL


 


RDW  15.0    (11.5-15.5)  %


 


Plt Count  640 H    (150-450)  k/uL


 


MPV  8.0    


 


Neutrophils %  83    %


 


Lymphocytes %  11    %


 


Monocytes %  3    %


 


Eosinophils %  1    %


 


Basophils %  0    %


 


Neutrophils #  15.5 H    (1.3-7.7)  k/uL


 


Lymphocytes #  2.1    (1.0-4.8)  k/uL


 


Monocytes #  0.6    (0-1.0)  k/uL


 


Eosinophils #  0.2    (0-0.7)  k/uL


 


Basophils #  0.0    (0-0.2)  k/uL


 


PT   10.9   (9.0-12.0)  sec


 


INR   1.0   (<1.2)  


 


APTT   22.1   (22.0-30.0)  sec


 


Sodium     (137-145)  mmol/L


 


Potassium     (3.5-5.1)  mmol/L


 


Chloride     ()  mmol/L


 


Carbon Dioxide     (22-30)  mmol/L


 


Anion Gap     mmol/L


 


BUN     (7-17)  mg/dL


 


Creatinine     (0.52-1.04)  mg/dL


 


Est GFR (CKD-EPI)AfAm     (>60 ml/min/1.73 sqM)  


 


Est GFR (CKD-EPI)NonAf     (>60 ml/min/1.73 sqM)  


 


Glucose     (74-99)  mg/dL


 


Plasma Lactic Acid Victor Manuel     (0.7-2.0)  mmol/L


 


Calcium     (8.4-10.2)  mg/dL


 


Magnesium     (1.6-2.3)  mg/dL


 


Total Bilirubin     (0.2-1.3)  mg/dL


 


AST     (14-36)  U/L


 


ALT     (4-34)  U/L


 


Alkaline Phosphatase     ()  U/L


 


C-Reactive Protein     (<10.0)  mg/L


 


Total Protein     (6.3-8.2)  g/dL


 


Albumin     (3.5-5.0)  g/dL


 


Urine Color    Yellow  


 


Urine Appearance    Cloudy H  (Clear)  


 


Urine pH    5.5  (5.0-8.0)  


 


Ur Specific Gravity    1.017  (1.001-1.035)  


 


Urine Protein    1+ H  (Negative)  


 


Urine Glucose (UA)    Negative  (Negative)  


 


Urine Ketones    1+ H  (Negative)  


 


Urine Blood    Trace H  (Negative)  


 


Urine Nitrite    Negative  (Negative)  


 


Urine Bilirubin    Negative  (Negative)  


 


Urine Urobilinogen    <2.0  (<2.0)  mg/dL


 


Ur Leukocyte Esterase    Large H  (Negative)  


 


Urine RBC    46 H  (0-5)  /hpf


 


Urine WBC    >182 H  (0-5)  /hpf


 


Urine WBC Clumps    Many H  (None)  /hpf


 


Urine Bacteria    Rare H  (None)  /hpf


 


Urine Yeast (Budding)    Few H  (None)  /hpf


 


Coronavirus (PCR)     (Not Detectd)  














  03/03/21 03/03/21 03/03/21 Range/Units





  18:08 18:08 18:08 


 


WBC     (3.8-10.6)  k/uL


 


RBC     (3.80-5.40)  m/uL


 


Hgb     (11.4-16.0)  gm/dL


 


Hct     (34.0-46.0)  %


 


MCV     (80.0-100.0)  fL


 


MCH     (25.0-35.0)  pg


 


MCHC     (31.0-37.0)  g/dL


 


RDW     (11.5-15.5)  %


 


Plt Count     (150-450)  k/uL


 


MPV     


 


Neutrophils %     %


 


Lymphocytes %     %


 


Monocytes %     %


 


Eosinophils %     %


 


Basophils %     %


 


Neutrophils #     (1.3-7.7)  k/uL


 


Lymphocytes #     (1.0-4.8)  k/uL


 


Monocytes #     (0-1.0)  k/uL


 


Eosinophils #     (0-0.7)  k/uL


 


Basophils #     (0-0.2)  k/uL


 


PT     (9.0-12.0)  sec


 


INR     (<1.2)  


 


APTT     (22.0-30.0)  sec


 


Sodium  141    (137-145)  mmol/L


 


Potassium  5.7 H    (3.5-5.1)  mmol/L


 


Chloride  107    ()  mmol/L


 


Carbon Dioxide  21 L    (22-30)  mmol/L


 


Anion Gap  13    mmol/L


 


BUN  128 H*    (7-17)  mg/dL


 


Creatinine  2.74 H    (0.52-1.04)  mg/dL


 


Est GFR (CKD-EPI)AfAm  17    (>60 ml/min/1.73 sqM)  


 


Est GFR (CKD-EPI)NonAf  15    (>60 ml/min/1.73 sqM)  


 


Glucose  75    (74-99)  mg/dL


 


Plasma Lactic Acid Victor Manuel   1.2   (0.7-2.0)  mmol/L


 


Calcium  8.6    (8.4-10.2)  mg/dL


 


Magnesium  2.6 H    (1.6-2.3)  mg/dL


 


Total Bilirubin  1.0    (0.2-1.3)  mg/dL


 


AST  76 H    (14-36)  U/L


 


ALT  47 H    (4-34)  U/L


 


Alkaline Phosphatase  96    ()  U/L


 


C-Reactive Protein  234.4 H    (<10.0)  mg/L


 


Total Protein  6.2 L    (6.3-8.2)  g/dL


 


Albumin  2.8 L    (3.5-5.0)  g/dL


 


Urine Color     


 


Urine Appearance     (Clear)  


 


Urine pH     (5.0-8.0)  


 


Ur Specific Gravity     (1.001-1.035)  


 


Urine Protein     (Negative)  


 


Urine Glucose (UA)     (Negative)  


 


Urine Ketones     (Negative)  


 


Urine Blood     (Negative)  


 


Urine Nitrite     (Negative)  


 


Urine Bilirubin     (Negative)  


 


Urine Urobilinogen     (<2.0)  mg/dL


 


Ur Leukocyte Esterase     (Negative)  


 


Urine RBC     (0-5)  /hpf


 


Urine WBC     (0-5)  /hpf


 


Urine WBC Clumps     (None)  /hpf


 


Urine Bacteria     (None)  /hpf


 


Urine Yeast (Budding)     (None)  /hpf


 


Coronavirus (PCR)    Not Detected  (Not Detectd)  














Disposition


Clinical Impression: 


 UTI (urinary tract infection), TANI (acute kidney injury), Decubitus ulcer, 

stage 4





Disposition: ADMITTED AS IP TO THIS HOSP


Condition: Fair


Referrals: 


Kobe Sidhu MD [Primary Care Provider] - 1-2 days


Decision Date: 03/03/21


Decision Time: 19:55

## 2021-03-03 NOTE — XR
EXAMINATION TYPE: XR chest 2V

 

DATE OF EXAM: 3/3/2021

 

COMPARISON: 5/12/2020

 

HISTORY: Altered mental status

 

TECHNIQUE:

 

FINDINGS: Heart is normal. Thoracic aorta is atheromatous. There are old right-sided healed rib fract
ures. There is arthritic change in the shoulder joints. There are no hilar masses. There is no heart 
failure. There is no pleural effusion. There is osteopenia.

 

IMPRESSION: No active cardiopulmonary disease. No adverse change.

## 2021-03-04 NOTE — P.CONS
History of Present Illness





- Reason for Consult


Consult date: 03/04/21


wound care





- History of Present Illness


[place] for failure to thrive 

confusion and a progressively worsening stage IV pressure ulcer to the coccyx.  

Patient is nonverbal history of dementia diabetes hypertension.  It is unclear 

what dressings were utilized for the ulceration.  Patient has a stage IV 

pressure ulceration to the coccyx with bone exposed.  Significant amount of 

slough and nonviable tissue.  Ulceration is odiferous with bone exposed.  

Undermining noted from 6:00 to 9:00.  Left posterior scapula stage II pressure 

ulcer with fatty layer exposure wound edges are attached wound base no 

undermining or tunneling noted significant amount of slough noted with minimal 

granulation.





My review of systems:


Unable to obtain due to mental status





Physical exam:


General Appearance: Alert, cooperative, no distress, appears stated age.


Skin: See HPI all other Skin color, texture, tugor normal, no rashes or lesions.


Neurologic: Alert oriented x3 





Assessment/plan:


1.  Stage IV pressure ulcer with bone exposure: Apply Santyl, saline moistened 

gauze, dry gauze, ABDs secure paper tape.  Change daily.  Turn patient every 2 

hours.  Utilize surface algorithm for appropriate surface for offloading.  

Consult nutrition.  Patient may benefit from excisional debridement however due 

to comorbidities surgical debridement may not be an option.  We'll be happy to 

see the patient in the wound care center can continue with debridements to 

assist and granulation.


2.  Stage II pressure ulcer left scapula with fat layer exposure.  Apply Santyl,

feeling was gauze, dry gauze, ABDs secure with paper tape change daily.  

Continue to offload.


3.  Failure to thrive





Thank you for the consultation any questions please contact the wound care 

center





DNP note has been reviewed and discussed with Dr. Baca and the impression 

and plan of care has been directed as dictated. 








Past Medical History


Past Medical History: Dementia, Diabetes Mellitus, GERD/Reflux, Hyperlipidemia, 

Hypertension


Additional Past Medical History / Comment(s): uti-ecoli, IBS, ulcerative colitis


History of Any Multi-Drug Resistant Organisms: ESBL


Year Discovered:: 11/2/20


MDRO Source:: URINE ESBL


Past Surgical History: Cholecystectomy


Past Anesthesia/Blood Transfusion Reactions: No Reported Reaction


Past Psychological History: No Psychological Hx Reported


Additional Psychological History / Comment(s): pt came from [facility], is mostly 

nonverbal at this time and bedbound


Smoking Status: Unknown if ever smoked


Past Alcohol Use History: None Reported


Past Drug Use History: None Reported





- Past Family History


  ** Father


History Unknown: Yes





  ** Mother


History Unknown: Yes





Medications and Allergies


                                Home Medications











 Medication  Instructions  Recorded  Confirmed  Type


 


Donepezil [Aricept] 10 mg PO HS@2100 10/08/17 03/03/21 History


 


Losartan Potassium 75 mg PO DAILY@0900 10/08/17 03/03/21 History


 


amLODIPine [Norvasc] 10 mg PO DAILY@0900 10/08/17 03/03/21 History


 


Magnesium Oxide [Mag-Ox] 400 mg PO BID@0900,2100 03/21/18 03/03/21 History


 


Memantine [Namenda] 5 mg PO BID@0900,2100 03/21/18 03/03/21 History


 


Acetaminophen [Tylenol] 650 mg PO Q6H PRN 05/12/20 03/03/21 History


 


Dicyclomine [Bentyl] 20 mg PO QID@09,13,17,21 05/12/20 03/03/21 History


 


Metoprolol Tartrate [Lopressor] 50 mg PO BID@0900,2100 05/12/20 03/03/21 History


 


Ensure Clear 1 can PO TID@0900,1400,2000 08/09/20 03/03/21 History


 


Ellie Lotion 1 applic TOPICAL Q6H PRN 08/09/20 03/03/21 History


 


Collagenase [Santyl] 1 applic TOPICAL HS 03/03/21 03/03/21 History


 


Famotidine [Pepcid] 20 mg PO HS@2100 03/03/21 03/03/21 History


 


Insulin Aspart [NovoLOG] See Protocol SQ TID@0800,1200,1700 03/03/21 03/03/21 

History


 


Insulin Degludec [Tresiba 12 unit SQ HS@2100 03/03/21 03/03/21 History





Flextouch U-100]    


 


Losartan Potassium [Cozaar] 25 mg PO DAILY@0900 03/03/21 03/03/21 History


 


Pioglitazone [Actos] 30 mg PO DAILY@0900 03/03/21 03/03/21 History


 


Prostat Awc 30 ml PO BID@0900,1700 03/03/21 03/03/21 History








                                    Allergies











Allergy/AdvReac Type Severity Reaction Status Date / Time


 


No Known Allergies Allergy   Verified 03/03/21 17:45














Physical Exam


Vitals: 


                                   Vital Signs











  Temp Pulse Pulse Resp BP BP Pulse Ox


 


 03/04/21 08:00  97.6 F   87  16    89 L


 


 03/04/21 00:52  97.7 F   118 H  17   118/71  99


 


 03/03/21 22:30     18   


 


 03/03/21 21:27  98.0 F  95   18  118/61   100


 


 03/03/21 19:20  97.9 F  98   20  113/56   100


 


 03/03/21 17:12  97.8 F  96   18  118/62   100








                                Intake and Output











 03/03/21 03/04/21 03/04/21





 22:59 06:59 14:59


 


Other:   


 


  Voiding Method Diaper  Diaper





 Incontinent  Incontinent


 


  # Voids  1 


 


  Weight 48.081 kg  














Results


CBC & Chem 7: 


                                 03/03/21 18:08





                                 03/04/21 06:53


Labs: 


                  Abnormal Lab Results - Last 24 Hours (Table)











  03/03/21 03/03/21 03/03/21 Range/Units





  18:08 18:08 18:08 


 


WBC  18.6 H    (3.8-10.6)  k/uL


 


Hgb  10.8 L    (11.4-16.0)  gm/dL


 


Hct  33.1 L    (34.0-46.0)  %


 


Plt Count  640 H    (150-450)  k/uL


 


Neutrophils #  15.5 H    (1.3-7.7)  k/uL


 


ESR     (0-20)  mm/hr


 


Sodium     (137-145)  mmol/L


 


Potassium    5.7 H  (3.5-5.1)  mmol/L


 


Chloride     ()  mmol/L


 


Carbon Dioxide    21 L  (22-30)  mmol/L


 


BUN    128 H*  (7-17)  mg/dL


 


Creatinine    2.74 H  (0.52-1.04)  mg/dL


 


Glucose     (74-99)  mg/dL


 


POC Glucose (mg/dL)     (75-99)  mg/dL


 


Calcium     (8.4-10.2)  mg/dL


 


Magnesium    2.6 H  (1.6-2.3)  mg/dL


 


AST    76 H  (14-36)  U/L


 


ALT    47 H  (4-34)  U/L


 


C-Reactive Protein    234.4 H  (<10.0)  mg/L


 


Total Protein    6.2 L  (6.3-8.2)  g/dL


 


Albumin    2.8 L  (3.5-5.0)  g/dL


 


Urine Appearance   Cloudy H   (Clear)  


 


Urine Protein   1+ H   (Negative)  


 


Urine Ketones   1+ H   (Negative)  


 


Urine Blood   Trace H   (Negative)  


 


Ur Leukocyte Esterase   Large H   (Negative)  


 


Urine RBC   46 H   (0-5)  /hpf


 


Urine WBC   >182 H   (0-5)  /hpf


 


Urine WBC Clumps   Many H   (None)  /hpf


 


Urine Bacteria   Rare H   (None)  /hpf


 


Urine Yeast (Budding)   Few H   (None)  /hpf














  03/03/21 03/04/21 03/04/21 Range/Units





  19:17 06:53 07:22 


 


WBC     (3.8-10.6)  k/uL


 


Hgb     (11.4-16.0)  gm/dL


 


Hct     (34.0-46.0)  %


 


Plt Count     (150-450)  k/uL


 


Neutrophils #     (1.3-7.7)  k/uL


 


ESR  106 H    (0-20)  mm/hr


 


Sodium   147 H   (137-145)  mmol/L


 


Potassium   5.6 H   (3.5-5.1)  mmol/L


 


Chloride   115 H   ()  mmol/L


 


Carbon Dioxide   16 L   (22-30)  mmol/L


 


BUN   105 H*   (7-17)  mg/dL


 


Creatinine   2.14 H   (0.52-1.04)  mg/dL


 


Glucose   120 H   (74-99)  mg/dL


 


POC Glucose (mg/dL)    132 H  (75-99)  mg/dL


 


Calcium   8.3 L   (8.4-10.2)  mg/dL


 


Magnesium   2.5 H   (1.6-2.3)  mg/dL


 


AST   51 H   (14-36)  U/L


 


ALT   38 H   (4-34)  U/L


 


C-Reactive Protein     (<10.0)  mg/L


 


Total Protein   5.4 L   (6.3-8.2)  g/dL


 


Albumin   2.4 L   (3.5-5.0)  g/dL


 


Urine Appearance     (Clear)  


 


Urine Protein     (Negative)  


 


Urine Ketones     (Negative)  


 


Urine Blood     (Negative)  


 


Ur Leukocyte Esterase     (Negative)  


 


Urine RBC     (0-5)  /hpf


 


Urine WBC     (0-5)  /hpf


 


Urine WBC Clumps     (None)  /hpf


 


Urine Bacteria     (None)  /hpf


 


Urine Yeast (Budding)     (None)  /hpf








                      Microbiology - Last 24 Hours (Table)











 03/03/21 18:08 Gram Stain - Preliminary





 Buttock Wound Culture - Preliminary


 


 03/03/21 18:08 Urine Culture - Preliminary





 Urine,Catheterized 














Assessment and Plan


(1) Pressure ulcer of sacral region, stage 4


Current Visit: Yes   Status: Acute   Code(s): L89.154 - PRESSURE ULCER OF SACRAL

 REGION, STAGE 4   SNOMED Code(s): 829349958


   





(2) Stage II pressure ulcer of back


Current Visit: Yes   Status: Acute   Code(s): L89.102 - PRESSURE ULCER OF 

UNSPECIFIED PART OF BACK, STAGE 2   SNOMED Code(s): 094843125


   





(3) Failure to thrive in adult


Current Visit: Yes   Status: Acute   Code(s): R62.7 - ADULT FAILURE TO THRIVE   

SNOMED Code(s): 880649882

## 2021-03-04 NOTE — HP
HISTORY AND PHYSICAL



DATE OF SERVICE:

03/04/2021.



CHIEF COMPLAINTS:

Failure to thrive and weakness.



HISTORY OF PRESENT ILLNESS:

This 87-year-old woman with a past medical history of multiple medical problems

including dementia, diabetes mellitus, GERD, hypertension, hyperlipidemia, history of

UTI, E coli, IBS, ulcerative colitis, being followed Dr. Sidhu at [facility] in the [facility],

was noted to have generalized weakness and the patient also had not been eating and the

patient also had an ulcer on the buttocks stage IV with exposed bone at this time and

the patient admitted for further evaluation and treatment. Currently the patient is

stuporous and unable to give coherent history.  Most of the history is taken from my

discussion with staff and review of the chart at this time.



PAST MEDICAL HISTORY:

Dementia, diabetes mellitus, GERD, hypertension, hyperlipidemia.



MEDICATIONS:

Medications are:

1. Tylenol.

2. Norvasc.

3. Santyl.

4. Bentyl.

5. Aricept.

6. NovoLog.

7. Levemir.

8. Namenda.

9. Lopressor.

10.Narcan.

11.Zofran.



ALLERGIES:

Please note allergies are none.



Family history, social history and review of systems could not be taken because of the

change in mental status.



PHYSICAL EXAMINATION:

Patient is stuporous.  Pulse 87, blood pressure 118/71, respirations 16, temperature

97.6, pulse ox 89% on room air.

HEENT:  Conjunctivae normal.

NECK: No jugular venous distention.

CARDIOVASCULAR: S1, S2 muffled.

RESPIRATORY: Breath sounds diminished at the bases. A few rhonchi. No crackles.

ABDOMEN:  Soft, nontender.  No mass palpable.

LEGS: No edema, no swelling.

NERVOUS SYSTEM: Diffusely weak, could not be examined completely.

EXAMINATION OF THE BACK: Stage IV decubitus with foul-smelling discharge

SKIN:  As mentioned.

JOINTS: No active deforming arthropathy.





LYMPHATICS:  No lymphadenopathy of the neck, axillae or groin.



LAB INVESTIGATION:

WBC 18.2, hemoglobin 10.8, sodium 141,  potassium 5.7, creatinine is 2.74.  The

baseline creatinine is 0.81, otherwise other labs are noted.



ASSESSMENT:

1. Stage IV decubitus ulcer with possible osteomyelitis with sepsis, present on

    admission.

2. Acute renal failure.

3. Hyperkalemia secondary to acute renal failure.

4. Increased WBC.

5. History of dementia.

6. Urinary tract infection, present on admission.

7. Diabetes mellitus type 2.

8. Gastroesophageal reflux disease.

9. Hypertension.

10.Hyperlipidemia.

11.History of ulcerative colitis.

12.History of irritable bowel syndrome.

13.History of ESBL.

14.NO CODE with instructions.

15.ESBL.

16.NO CODE with instructions.



RECOMMENDATIONS AND DISCUSSION:

This 87-year-old woman who presented with multiple complex medical issues, we will

monitor the patient closely.  We will initiate broad-spectrum IV antibiotics. Consult

Dr. Wu and as well as Surgery for possible debridement other than that wound care.

The patient is started on IV Unasyn.  Prognosis guarded because of multiple complex

medical issues. Further recommendations to follow. A copy of dictation forwarded to Dr. Sidhu who is the primary physician. Home medications will be reviewed and continued.





MMODL / LORIN: [id number] / Job#: [id number]

## 2021-03-04 NOTE — P.NPCON
History of Present Illness





- Reason for Consult


acute renal failure, hyperkalemia





- History of Present Illness





Reason for consultation: Acute kidney injury and hyperkalemia





History of present illness:


Patient is a 87-year-old female seen in consultation for acute kidney injury.  

Patient's creatinine as of November 2020 was 0.81.  It was elevated at 2.74 on 

admission and is 2.14 today.  She presents from an extended care facility due to

failure to thrive.  She is noted to have a large sacral decubitus ulcer as well.

 Patient is not a reliable historian and does not respond to verbal commands.  

Her potassium level was also high at 5.7 and is 5.6 today.  She is quite 

acidotic with a bicarbonate level of 16 today.  Sodium level was also high at 

147 today.  Blood pressure is stable.  Patient is incontinent.  I's and O's are 

not measured.  She does have history of diabetes.  She was also on losartan at 

home which is currently held.  She is receiving normal saline at 75 mL an hour. 

No report of vomiting or diarrhea.  I don't see any nonsteroidals and her home 

medications.  No fever.





Vital signs are stable.


General: The patient appeared well nourished and normally developed. 


HEENT: Head exam is unremarkable. Neck is without jugular venous distension.


LUNGS: Lungs are clear to auscultation and percussion. Breath sounds decreased.


HEART: Rate and Rhythm are regular.


ABDOMEN: Soft, nontender.


EXTREMITITES: No edema.





Past Medical History


Past Medical History: Dementia, Diabetes Mellitus, GERD/Reflux, Hyperlipidemia, 

Hypertension


Additional Past Medical History / Comment(s): uti-ecoli, IBS, ulcerative colitis


History of Any Multi-Drug Resistant Organisms: ESBL


Date of last positivie culture/infection: 11/2/20


MDRO Source:: URINE ESBL


Past Surgical History: Cholecystectomy


Past Anesthesia/Blood Transfusion Reactions: No Reported Reaction


Past Psychological History: No Psychological Hx Reported


Additional Psychological History / Comment(s): pt came from [facility], is mostly 

nonverbal at this time and bedbound


Smoking Status: Unknown if ever smoked


Past Alcohol Use History: None Reported


Past Drug Use History: None Reported





- Past Family History


  ** Father


History Unknown: Yes





  ** Mother


History Unknown: Yes





Medications and Allergies


                                Home Medications











 Medication  Instructions  Recorded  Confirmed  Type


 


Donepezil [Aricept] 10 mg PO HS@2100 10/08/17 03/03/21 History


 


Losartan Potassium 75 mg PO DAILY@0900 10/08/17 03/03/21 History


 


amLODIPine [Norvasc] 10 mg PO DAILY@0900 10/08/17 03/03/21 History


 


Magnesium Oxide [Mag-Ox] 400 mg PO BID@0900,2100 03/21/18 03/03/21 History


 


Memantine [Namenda] 5 mg PO BID@0900,2100 03/21/18 03/03/21 History


 


Acetaminophen [Tylenol] 650 mg PO Q6H PRN 05/12/20 03/03/21 History


 


Dicyclomine [Bentyl] 20 mg PO QID@09,13,17,21 05/12/20 03/03/21 History


 


Metoprolol Tartrate [Lopressor] 50 mg PO BID@0900,2100 05/12/20 03/03/21 History


 


Ensure Clear 1 can PO TID@0900,1400,2000 08/09/20 03/03/21 History


 


Ellie Lotion 1 applic TOPICAL Q6H PRN 08/09/20 03/03/21 History


 


Collagenase [Santyl] 1 applic TOPICAL HS 03/03/21 03/03/21 History


 


Famotidine [Pepcid] 20 mg PO HS@2100 03/03/21 03/03/21 History


 


Insulin Aspart [NovoLOG] See Protocol SQ TID@0800,1200,1700 03/03/21 03/03/21 

History


 


Insulin Degludec [Tresiba 12 unit SQ HS@2100 03/03/21 03/03/21 History





Flextouch U-100]    


 


Losartan Potassium [Cozaar] 25 mg PO DAILY@0900 03/03/21 03/03/21 History


 


Pioglitazone [Actos] 30 mg PO DAILY@0900 03/03/21 03/03/21 History


 


Prostat Awc 30 ml PO BID@0900,1700 03/03/21 03/03/21 History








                                    Allergies











Allergy/AdvReac Type Severity Reaction Status Date / Time


 


No Known Allergies Allergy   Verified 03/03/21 17:45














Physical Exam


Vitals: 


                                   Vital Signs











  Temp Pulse Pulse Resp BP BP Pulse Ox


 


 03/04/21 08:00  97.6 F   87  16    89 L


 


 03/04/21 00:52  97.7 F   118 H  17   118/71  99


 


 03/03/21 22:30     18   


 


 03/03/21 21:27  98.0 F  95   18  118/61   100


 


 03/03/21 19:20  97.9 F  98   20  113/56   100


 


 03/03/21 17:12  97.8 F  96   18  118/62   100








                                Intake and Output











 03/03/21 03/04/21 03/04/21





 22:59 06:59 14:59


 


Other:   


 


  Voiding Method Diaper  Diaper





 Incontinent  Incontinent


 


  # Voids  1 


 


  Weight 48.081 kg  














Results





- Lab Results


                             Most recent lab results











Calcium  8.3 mg/dL (8.4-10.2)  L  03/04/21  06:53    


 


Magnesium  2.5 mg/dL (1.6-2.3)  H  03/04/21  06:53    














                                 03/03/21 18:08





                                 03/04/21 06:53





Assessment and Plan


Plan: 





Assessment:


1.  Acute kidney injury mostly prerenal from poor intake.  Creatinine 2.74 on 

admission and is 2.14 today.  Creatinine was near 1 in November 2020.  Rule out 

urinary retention.


2.  Hypernatremia from lack of oral water intake.


3.  Metabolic acidosis secondary to acute kidney injury and IV fluids.


4.  Hyperkalemia secondary to acute kidney injury, acidosis and losartan.


5.  Diabetes mellitus.


6.  Stage IV decubitus ulcer.





Plan:


Stop normal saline.


Start D5W with 100 meq of bicarb to be run at 100 mL an hour.


I will also add oral sodium bicarb.


Hold losartan.


Repeat potassium level this evening.


Check bladder scan to rule out urinary retention.


Check renal ultrasound.


Continue to monitor renal function and urine output.





Thank you for the consultation.  I will continue to follow the patient with you 

during her hospital stay.

## 2021-03-04 NOTE — US
EXAMINATION TYPE: US kidneys/renal and bladder

 

DATE OF EXAM: 3/4/2021

 

COMPARISON: CT 7/4/2018

 

CLINICAL HISTORY: tomasz.

 

EXAM MEASUREMENTS:

 

Right Kidney:  8.3 cm, possibly foreshortened view

Left Kidney: 7.5 cm cm

 

 

 

Right Kidney: No hydronephrosis. There is cortical thinning.  

Left Kidney: Cortical thinning is present. Possible cortical cyst at the upper pole is not well radha
cterized, no hydronephrosis  

Bladder: There is dependent debris present within the bladder

**Bilateral Jets seen: No

 

Cortical medullary differentiation is maintained.

 

 

 

IMPRESSION:

Dependent debris within the bladder. Findings consistent with renal failure, medical renal disease. N
o evident hydronephrosis.

## 2021-03-05 NOTE — P.PCN
Date of Procedure: 03/05/21


Preoperative Diagnosis: 


Stage IV sacral decubitus ulcer


Postoperative Diagnosis: 


Stage IV sacral decubitus ulcer


Procedure(s) Performed: 


Sharp excisional debridement, stage IV sacral decubital ulcer


Anesthesia: local


Surgeon: Terri Rae


Pathology: none sent


Condition: stable


Disposition: floor


Indications for Procedure: 


87-year-old female presented with large sacral decubitus ulcer with significant 

amount of slough and necrotic tissue.  There is concern of infection at this 

site.  Bone is exposed and concern for osteomyelitis as well.  Plan is for 

debridement at bedside as the patient is current clinical status would not allow

for OR debridement.  Patient's son is power of  and has provided 

consent.  Risks, benefits and alternatives were provided prior to the consent 

been provided.


Operative Findings: 


Large sacral decubitus ulcer with necrotic foul-smelling slough, exposed coccyx 

and sacrum


Description of Procedure: 


Patient was prepped and draped in regular sterile fashion.  A 10 blade scalpel 

was used to perform sharp excisional debridement.  There is undermining noted at

the left lateral portion of the wound and debridement was performed until some 

bleeding was noted at the wound bed and the wound lateral edge.  On exam, the 

base of the wound is noted to be exposed bone with sacrum and coccyx exposed.  

Sharp excisional debridement was also performed on the right lateral portion of 

the wound where necrotic tissue was present.  Debridement was performed until 

some bleeding at the edges were noted.  





Based on the size of the wound and the patient's current clinical status, would 

recommend further chemical debridement with local wound care.  She will likely 

require further debridements in the future based on her clinical status.  Case 

was discussed with patient's admitting physician

## 2021-03-05 NOTE — CONS
CONSULTATION



DATE OF SERVICE:

03/04/2021



REASON FOR CONSULTATION:

Sacral pressure ulcer infected.



HISTORY OF PRESENT ILLNESS:

The patient is an 87-year-old  female, past medical history significant for

dementia, diabetes mellitus, hypertension, and nursing home resident.  The patient was

sent to the ER from nursing home for evaluation of failure to thrive.  Apparently, the

patient has been not eating or drinking for the last few days to a week.  The patient

did have ulcer to the sacral area that has been there for a couple of weeks to months

now and was noticed to have more foul smelling drainage from it, but no clear fever.

On presentation to the hospital, the patient has been afebrile.  Patient did have white

count of 18.6, _____ was 2.14.  Urine has been positive. Slade PCR was negative.  The

patient did have a chest x-ray, no active cardiopulmonary disease.  Patient was started

on vancomycin in the hospital.  Infectious Disease was consulted regarding her infected

sacral pressure ulcer.  Most information has been obtained from review of the chart and

talking to the nursing staff as the patient did not answer a single question.



REVIEW OF SYSTEMS:

Positive points have been mentioned in HPI.  Complete review could not be obtained

because of underlying mental status.



PAST MEDICAL HISTORY:

Dementia, diabetes mellitus, gastroesophageal reflux disease, hypertension,

hyperlipidemia,  colitis, history of UTI.



PAST SURGICAL HISTORY:

Cholecystectomy.



SOCIAL HISTORY:

Nursing home resident.  No history of smoking, drinking or drug use.



FAMILY HISTORY:

No pertinent findings noticed.



ALLERGIES:

No known drug allergies.



MEDICATIONS:

The patient is currently on vancomycin pharmacy to dose, Tylenol, Norvasc, Bentyl,

Aricept, NovoLog, Levemir, Mag oxide, Namenda, Lopressor, Narcan, Zofran.



PHYSICAL EXAMINATION:

Blood pressure is 110/65, pulse of 69, temperature 97.4.  She is 97% on room air.

General description is an elderly female lying in bed in no distress.

HEENT:  Examination shows no pallor or scleral icterus.  Oral mucous membranes dry.

NECK: Trachea central.  No thyromegaly.  LUNGS: Unlabored breathing, clear to

auscultation anteriorly.

HEART S1, S2.  Regular rate and rhythm.

ABDOMEN:  Soft, no tenderness.  No guarding. No rigidity.

EXTREMITIES: No edema of the feet.

SKIN examination of sacral area did show unstageable sacral pressure ulcer with

significant slough tissue surrounding redness and foul smelling drainage.

NEUROLOGICAL: Patient is awake, alert.  Orientation could not be determined as the

patient nonverbal.



LABS:

Hemoglobin is 10.1, white count of 18.6, BUN of 105, creatinine is 2.14, white count

5.3 repeat is 4.5.  Liver enzymes mildly elevated.  Urine is positive.



DIAGNOSTIC IMPRESSION:

1. Patient admitted to the hospital with failure to thrive in this patient who did

    have a large sacral pressure ulcer, unstageable with likely infection.  Significant

    MRSA, may need to be surgically debrided.

2. Patient with urinary tract infection, likely from enteric gram-negative pathogen.

3. _____The patient is high risk of nephrotoxicity.



PLAN:

1. Discontinue vancomycin.

2. Start the patient on Zosyn 3 grams q.8 hours.

3. General surgery evaluation for surgical debridement and deep culture.

4. We will follow on clinical condition and further adjust medication if needed.

Thank you for this consultation.  Will follow this patient along with you.





MMODL / IJN: [id number] / Job#: [id number]

## 2021-03-05 NOTE — P.GSCN
History of Present Illness


Consult date: 03/05/21


History of present illness: 





87-year-old female presented from nursing facility to the emergency department 

secondary to progressively worsening stage IV Decubitus Sacral Ulcer.  She is 

known to have failure to thrive along with dementia, diabetes and hypertension. 

She has received care for this decubitus ulcer while at the nursing facility.  

There is concern of infection at this site.  Patient is nonverbal and medical 

decisions are made by the patient's son.  Unable to obtain history based on the 

patient's nonverbal status.  Medical chart was reviewed to obtain history.





Review of Systems


ROS unobtainable: due to mental status





Past Medical History


Past Medical History: Dementia, Diabetes Mellitus, GERD/Reflux, Hyperlipidemia, 

Hypertension


Additional Past Medical History / Comment(s): uti-ecoli, IBS, ulcerative colitis


History of Any Multi-Drug Resistant Organisms: ESBL


Year Discovered:: 11/2/20


MDRO Source:: URINE ESBL


Past Surgical History: Cholecystectomy


Past Anesthesia/Blood Transfusion Reactions: No Reported Reaction


Past Psychological History: No Psychological Hx Reported


Additional Psychological History / Comment(s): pt came from [facility], is mostly 

nonverbal at this time and bedbound


Smoking Status: Unknown if ever smoked


Past Alcohol Use History: None Reported


Past Drug Use History: None Reported





- Past Family History


  ** Father


History Unknown: Yes





  ** Mother


History Unknown: Yes





Medications and Allergies


                                Home Medications











 Medication  Instructions  Recorded  Confirmed  Type


 


Donepezil [Aricept] 10 mg PO HS@2100 10/08/17 03/03/21 History


 


Losartan Potassium 75 mg PO DAILY@0900 10/08/17 03/03/21 History


 


amLODIPine [Norvasc] 10 mg PO DAILY@0900 10/08/17 03/03/21 History


 


Magnesium Oxide [Mag-Ox] 400 mg PO BID@0900,2100 03/21/18 03/03/21 History


 


Memantine [Namenda] 5 mg PO BID@0900,2100 03/21/18 03/03/21 History


 


Acetaminophen [Tylenol] 650 mg PO Q6H PRN 05/12/20 03/03/21 History


 


Dicyclomine [Bentyl] 20 mg PO QID@09,13,17,21 05/12/20 03/03/21 History


 


Metoprolol Tartrate [Lopressor] 50 mg PO BID@0900,2100 05/12/20 03/03/21 History


 


Ensure Clear 1 can PO TID@0900,1400,2000 08/09/20 03/03/21 History


 


Ellie Lotion 1 applic TOPICAL Q6H PRN 08/09/20 03/03/21 History


 


Collagenase [Santyl] 1 applic TOPICAL HS 03/03/21 03/03/21 History


 


Famotidine [Pepcid] 20 mg PO HS@2100 03/03/21 03/03/21 History


 


Insulin Aspart [NovoLOG] See Protocol SQ TID@0800,1200,1700 03/03/21 03/03/21 

History


 


Insulin Degludec [Tresiba 12 unit SQ HS@2100 03/03/21 03/03/21 History





Flextouch U-100]    


 


Losartan Potassium [Cozaar] 25 mg PO DAILY@0900 03/03/21 03/03/21 History


 


Pioglitazone [Actos] 30 mg PO DAILY@0900 03/03/21 03/03/21 History


 


Prostat Awc 30 ml PO BID@0900,1700 03/03/21 03/03/21 History








                                    Allergies











Allergy/AdvReac Type Severity Reaction Status Date / Time


 


No Known Allergies Allergy   Verified 03/03/21 17:45














Surgical - Exam


Osteopathic Statement: *.  No significant issues noted on an osteopathic 

structural exam other than those noted in the History and Physical/Consult.


                                   Vital Signs











Temp Pulse Resp BP Pulse Ox


 


 97.8 F   96   18   118/62   100 


 


 03/03/21 17:12  03/03/21 17:12  03/03/21 17:12  03/03/21 17:12  03/03/21 17:12














- General


no distress





- Eyes


normal ocular movement





- Neck


trachea midline





- Respiratory


normal respiratory effort





- Integumentary





Stage IV pressure ulceration to the coccyx with bone exposed.  Significant 

amount of green slough and nonviable tissue.  There is an odor to the wound.  

Undermining to the inferior and left side of the wound is noted.  Muscle is also

 exposed inferiorly.  





Results





- Labs





                                 03/05/21 06:12





                                 03/05/21 06:12


                  Abnormal Lab Results - Last 24 Hours (Table)











  03/04/21 03/04/21 03/04/21 Range/Units





  06:53 11:54 16:53 


 


WBC     (4.50-10.00)  X 10*3/uL


 


RBC     (4.10-5.20)  X 10*6/uL


 


Hgb     (12.0-15.0)  g/dL


 


Hct     (37.2-46.3)  %


 


MCH     (27.0-32.0)  pg


 


RDW     (11.5-14.5)  %


 


Plt Count     (140-440)  X 10*3/uL


 


Immature Gran #     (0.00-0.04)  X 10*3/uL


 


Neutrophils #     (1.80-7.70)  X 10*3/uL


 


BUN     (7-17)  mg/dL


 


Creatinine     (0.52-1.04)  mg/dL


 


Glucose     (74-99)  mg/dL


 


POC Glucose (mg/dL)   143 H  251 H  (75-99)  mg/dL


 


Hemoglobin A1c  9.7 H    (4.0-6.0)  %


 


Calcium     (8.4-10.2)  mg/dL














  03/04/21 03/05/21 03/05/21 Range/Units





  20:32 06:12 06:12 


 


WBC    16.39 H  (4.50-10.00)  X 10*3/uL


 


RBC    3.27 L  (4.10-5.20)  X 10*6/uL


 


Hgb    8.8 L  (12.0-15.0)  g/dL


 


Hct    27.3 L  (37.2-46.3)  %


 


MCH    26.9 L  (27.0-32.0)  pg


 


RDW    16.7 H  (11.5-14.5)  %


 


Plt Count    574 H  (140-440)  X 10*3/uL


 


Immature Gran #    0.16 H  (0.00-0.04)  X 10*3/uL


 


Neutrophils #    13.75 H  (1.80-7.70)  X 10*3/uL


 


BUN   74 H   (7-17)  mg/dL


 


Creatinine   1.18 H   (0.52-1.04)  mg/dL


 


Glucose   167 H   (74-99)  mg/dL


 


POC Glucose (mg/dL)  239 H    (75-99)  mg/dL


 


Hemoglobin A1c     (4.0-6.0)  %


 


Calcium   8.0 L   (8.4-10.2)  mg/dL














  03/05/21 03/05/21 Range/Units





  07:05 11:22 


 


WBC    (4.50-10.00)  X 10*3/uL


 


RBC    (4.10-5.20)  X 10*6/uL


 


Hgb    (12.0-15.0)  g/dL


 


Hct    (37.2-46.3)  %


 


MCH    (27.0-32.0)  pg


 


RDW    (11.5-14.5)  %


 


Plt Count    (140-440)  X 10*3/uL


 


Immature Gran #    (0.00-0.04)  X 10*3/uL


 


Neutrophils #    (1.80-7.70)  X 10*3/uL


 


BUN    (7-17)  mg/dL


 


Creatinine    (0.52-1.04)  mg/dL


 


Glucose    (74-99)  mg/dL


 


POC Glucose (mg/dL)  198 H  119 H  (75-99)  mg/dL


 


Hemoglobin A1c    (4.0-6.0)  %


 


Calcium    (8.4-10.2)  mg/dL








                      Microbiology - Last 24 Hours (Table)











 03/03/21 21:04 Blood Culture Gram Stain - Preliminary





 Blood 


 


 03/03/21 18:08 Blood Culture - Preliminary





 Blood    No Growth after 24 hours


 


 03/03/21 18:08 Urine Culture - Preliminary





 Urine,Catheterized    Yeast species


 


 03/03/21 18:08 Gram Stain - Preliminary





 Buttock Wound Culture - Preliminary





    Gram Neg Bacilli





    Gram Neg Bacilli#2


 


 03/03/21 21:04 Blood Culture - Final





 Blood 








                                 Diabetes panel











  03/04/21 03/04/21 03/05/21 Range/Units





  06:53 18:52 06:12 


 


Sodium    142  (137-145)  mmol/L


 


Potassium   4.5  3.8  (3.5-5.1)  mmol/L


 


Chloride    106  ()  mmol/L


 


Carbon Dioxide    29  (22-30)  mmol/L


 


BUN    74 H  (7-17)  mg/dL


 


Creatinine    1.18 H  (0.52-1.04)  mg/dL


 


Glucose    167 H  (74-99)  mg/dL


 


Hemoglobin A1c  9.7 H    (4.0-6.0)  %


 


Calcium    8.0 L  (8.4-10.2)  mg/dL








                                  Calcium panel











  03/05/21 Range/Units





  06:12 


 


Calcium  8.0 L  (8.4-10.2)  mg/dL








                                 Pituitary panel











  03/04/21 03/05/21 Range/Units





  18:52 06:12 


 


Sodium   142  (137-145)  mmol/L


 


Potassium  4.5  3.8  (3.5-5.1)  mmol/L


 


Chloride   106  ()  mmol/L


 


Carbon Dioxide   29  (22-30)  mmol/L


 


BUN   74 H  (7-17)  mg/dL


 


Creatinine   1.18 H  (0.52-1.04)  mg/dL


 


Glucose   167 H  (74-99)  mg/dL


 


Calcium   8.0 L  (8.4-10.2)  mg/dL








                                  Adrenal panel











  03/04/21 03/05/21 Range/Units





  18:52 06:12 


 


Sodium   142  (137-145)  mmol/L


 


Potassium  4.5  3.8  (3.5-5.1)  mmol/L


 


Chloride   106  ()  mmol/L


 


Carbon Dioxide   29  (22-30)  mmol/L


 


BUN   74 H  (7-17)  mg/dL


 


Creatinine   1.18 H  (0.52-1.04)  mg/dL


 


Glucose   167 H  (74-99)  mg/dL


 


Calcium   8.0 L  (8.4-10.2)  mg/dL














Assessment and Plan


Plan: 





87-year-old female with stage IV sacral decubitus ulcer.  Based on the patient's

 significant comorbidities, she is not a appropriate candidate for an excisional

 debridement in the operating room.  We did attempt to reach the patient's power

 of , son, to discuss possible bedside procedure for debridement.  He 

has not returned that call as of yet.  I would recommend continued local wound 

care and plan for future bedside debridement of this wound if consent provided 

by patient's son.

## 2021-03-05 NOTE — P.PN
Subjective





Patient is seen in follow-up for acute kidney injury.  Renal function continues 

to improve.  Oral intake remains quite poor.  Patient is incontinent but has 

been waiting.  Not a reliable historian.





Vital signs are stable.


General: The patient appeared well nourished and normally developed. 


HEENT: Head exam is unremarkable. Neck is without jugular venous distension.


LUNGS: Lungs are clear to auscultation and percussion. Breath sounds decreased.


HEART: Rate and Rhythm are regular. 


ABDOMEN: Soft, nontender.


EXTREMITITES: No edema.





Objective





- Vital Signs


Vital signs: 


                                   Vital Signs











Temp  98.4 F   03/05/21 08:00


 


Pulse  91   03/05/21 08:00


 


Resp  15   03/05/21 08:00


 


BP  105/62   03/05/21 08:00


 


Pulse Ox  99   03/05/21 08:00








                                 Intake & Output











 03/04/21 03/05/21 03/05/21





 18:59 06:59 18:59


 


Weight 48.081 kg  


 


Other:   


 


  Voiding Method Diaper Diaper Diaper





 Incontinent Incontinent Incontinent


 


  # Voids 1 1 














- Labs


CBC & Chem 7: 


                                 03/05/21 06:12





                                 03/05/21 06:12


Labs: 


                  Abnormal Lab Results - Last 24 Hours (Table)











  03/04/21 03/04/21 03/04/21 Range/Units





  06:53 11:54 16:53 


 


WBC     (4.50-10.00)  X 10*3/uL


 


RBC     (4.10-5.20)  X 10*6/uL


 


Hgb     (12.0-15.0)  g/dL


 


Hct     (37.2-46.3)  %


 


MCH     (27.0-32.0)  pg


 


RDW     (11.5-14.5)  %


 


Plt Count     (140-440)  X 10*3/uL


 


Immature Gran #     (0.00-0.04)  X 10*3/uL


 


Neutrophils #     (1.80-7.70)  X 10*3/uL


 


BUN     (7-17)  mg/dL


 


Creatinine     (0.52-1.04)  mg/dL


 


Glucose     (74-99)  mg/dL


 


POC Glucose (mg/dL)   143 H  251 H  (75-99)  mg/dL


 


Hemoglobin A1c  9.7 H    (4.0-6.0)  %


 


Calcium     (8.4-10.2)  mg/dL














  03/04/21 03/05/21 03/05/21 Range/Units





  20:32 06:12 06:12 


 


WBC    16.39 H  (4.50-10.00)  X 10*3/uL


 


RBC    3.27 L  (4.10-5.20)  X 10*6/uL


 


Hgb    8.8 L  (12.0-15.0)  g/dL


 


Hct    27.3 L  (37.2-46.3)  %


 


MCH    26.9 L  (27.0-32.0)  pg


 


RDW    16.7 H  (11.5-14.5)  %


 


Plt Count    574 H  (140-440)  X 10*3/uL


 


Immature Gran #    0.16 H  (0.00-0.04)  X 10*3/uL


 


Neutrophils #    13.75 H  (1.80-7.70)  X 10*3/uL


 


BUN   74 H   (7-17)  mg/dL


 


Creatinine   1.18 H   (0.52-1.04)  mg/dL


 


Glucose   167 H   (74-99)  mg/dL


 


POC Glucose (mg/dL)  239 H    (75-99)  mg/dL


 


Hemoglobin A1c     (4.0-6.0)  %


 


Calcium   8.0 L   (8.4-10.2)  mg/dL














  03/05/21 03/05/21 Range/Units





  07:05 11:22 


 


WBC    (4.50-10.00)  X 10*3/uL


 


RBC    (4.10-5.20)  X 10*6/uL


 


Hgb    (12.0-15.0)  g/dL


 


Hct    (37.2-46.3)  %


 


MCH    (27.0-32.0)  pg


 


RDW    (11.5-14.5)  %


 


Plt Count    (140-440)  X 10*3/uL


 


Immature Gran #    (0.00-0.04)  X 10*3/uL


 


Neutrophils #    (1.80-7.70)  X 10*3/uL


 


BUN    (7-17)  mg/dL


 


Creatinine    (0.52-1.04)  mg/dL


 


Glucose    (74-99)  mg/dL


 


POC Glucose (mg/dL)  198 H  119 H  (75-99)  mg/dL


 


Hemoglobin A1c    (4.0-6.0)  %


 


Calcium    (8.4-10.2)  mg/dL








                      Microbiology - Last 24 Hours (Table)











 03/03/21 21:04 Blood Culture Gram Stain - Preliminary





 Blood 


 


 03/03/21 18:08 Blood Culture - Preliminary





 Blood    No Growth after 24 hours


 


 03/03/21 18:08 Urine Culture - Preliminary





 Urine,Catheterized    Yeast species


 


 03/03/21 18:08 Gram Stain - Preliminary





 Buttock Wound Culture - Preliminary





    Gram Neg Bacilli





    Gram Neg Bacilli#2


 


 03/03/21 21:04 Blood Culture - Final





 Blood 














Assessment and Plan


Plan: 





Assessment:


1.  Acute kidney injury mostly prerenal from poor intake.  Creatinine 2.74 on 

admission and is 1.18 today.  Creatinine was near 1 in November 2020.  Renal 

ultrasound revealed no evidence of hydronephrosis but both kidneys are atrophic.


2.  Hypernatremia from lack of oral water intake.  Improved.


3.  Metabolic acidosis secondary to acute kidney injury and IV fluids.  

Improved.


4.  Hyperkalemia secondary to acute kidney injury, acidosis and losartan.  

Resolved.


5.  Diabetes mellitus.


6.  Stage IV decubitus ulcer.





Plan:


Stop hypotonic bicarbonate drip.


Start normal saline at 75 mL an hour.


Hold losartan.


Continue to monitor renal function and urine output.

## 2023-09-23 NOTE — DS
DISCHARGE SUMMARY



DATE OF ADMISSION:

03/03/2021



DATE OF DISCHARGE:

03/05/2021.



FINAL DIAGNOSES:

1. Stage IV decubitus ulcer with possible acute osteomyelitis with sepsis, POA.

2. Hyperkalemia due to acute kidney injury.

3. Severe cognitive impairment due to underlying dementia.

4. Urinary tract infection from cystitis, POA.

5. Diabetes mellitus type 2 on oral hypoglycemic.

6. Gastroesophageal reflux disease.

7. Essential hypertension.

8. Hyperlipidemia.

9. Irritable bowel syndrome.

10.Acute kidney injury, prerenal due to poor oral intake.

11.Hypernatremia from lack of oral intake.

12.Metabolic acidosis due to acute kidney injury.



CONSULTATIONS:

1. Dr. Rae from General surgery.

2. Dr. Gaitan from Nephrology.

3. Dr. Wu from Infectious Disease.

4. Wound consultation with Philly Walton.



HOSPITAL COURSE:

This elderly lady from nursing home presented with weakness, tiredness, failure to

thrive, not eating, drinking at all.  The patient has a stage IV large sacral decubitus

ulcer.  It was debrided.  Also put on IV antibiotics.



Today, I spoke to Dr. Rae from General surgery.  The decubitus ulcer is not

salvageable. It is very large and not treatable. The patient has not been eating or

drinking at all.  I spoke to patient's son and agreeable to send the patient back to

the [facility] with hospice. Discharge planning more than 35 minutes.



PHYSICAL EXAMINATION:

Temperature 97.7, pulse 75, respiration 15, blood pressure 107/44, pulse ox 94% on room

air.

GENERAL APPEARANCE: Lying in bed, thin built, wasting of muscles, awake.

CARDIOVASCULAR: First and second sounds normal.  No edema.

PSYCH: Patient not answering questions.



INVESTIGATIONS:

White count 16.3, hemoglobin 8.8, platelets 574.  Potassium 3.8, creatinine 1.18.



DISCHARGE MEDICATIONS:

1. Losartan 75 mg a day.

2. Magnesium oxide 400 mg b.i.d.

3. Tylenol 650 mg q.6 p.r.n.

4. Bentyl 20 mg q.i.d.

5. Lopressor 50 mg b.i.d.

6. Ensure 1 can p.o. t.i.d. p.r.n.

7. Santyl topical at bedtime.

8. Pepcid 20 mg at bedtime.



DISPOSITION:

ECF with hospice.



ADVANCED CARE PLANNING:

I had a talk with the patient's son, Russ Toro, telephone number [phone].  The

patient's overall condition was discussed.  He understood that the patient is doing

very poorly, no quality of life and was okay with the patient proceeding with hospice.

Unnecessary medications will be discontinued in context of that.  Feeding will be

comfort care.  No artificial feeding otherwise.  Questions were answered.  The patient

is DO NOT RESUSCITATE.  The patient will return to the ECF with hospice.



Time spent for this was about 20 minutes.



DISPOSITION:

ECF/[facility].





VIOLET / IJN: [id number] / Job#: [id number] alert and awake